# Patient Record
Sex: MALE | HISPANIC OR LATINO
[De-identification: names, ages, dates, MRNs, and addresses within clinical notes are randomized per-mention and may not be internally consistent; named-entity substitution may affect disease eponyms.]

---

## 2017-02-04 ENCOUNTER — HOSPITAL (OUTPATIENT)
Dept: OTHER | Age: 62
End: 2017-02-04
Attending: FAMILY MEDICINE

## 2020-05-01 ENCOUNTER — EXTERNAL RECORD (OUTPATIENT)
Dept: OTHER | Age: 65
End: 2020-05-01

## 2022-01-07 ENCOUNTER — HOSPITAL ENCOUNTER (OUTPATIENT)
Dept: GENERAL RADIOLOGY | Age: 67
Discharge: HOME | End: 2022-01-07
Attending: FAMILY MEDICINE
Payer: MEDICARE

## 2022-01-07 ENCOUNTER — OFFICE VISIT (OUTPATIENT)
Dept: FAMILY MEDICINE CLINIC | Facility: CLINIC | Age: 67
End: 2022-01-07
Payer: MEDICARE

## 2022-01-07 VITALS
HEIGHT: 69 IN | WEIGHT: 184 LBS | HEART RATE: 73 BPM | DIASTOLIC BLOOD PRESSURE: 64 MMHG | BODY MASS INDEX: 27.25 KG/M2 | TEMPERATURE: 98 F | SYSTOLIC BLOOD PRESSURE: 123 MMHG

## 2022-01-07 DIAGNOSIS — N13.8 BPH WITH OBSTRUCTION/LOWER URINARY TRACT SYMPTOMS: ICD-10-CM

## 2022-01-07 DIAGNOSIS — N40.1 BPH WITH OBSTRUCTION/LOWER URINARY TRACT SYMPTOMS: ICD-10-CM

## 2022-01-07 DIAGNOSIS — E55.9 VITAMIN D DEFICIENCY: ICD-10-CM

## 2022-01-07 DIAGNOSIS — E78.00 HYPERCHOLESTEREMIA: ICD-10-CM

## 2022-01-07 DIAGNOSIS — K21.00 GASTROESOPHAGEAL REFLUX DISEASE WITH ESOPHAGITIS, UNSPECIFIED WHETHER HEMORRHAGE: ICD-10-CM

## 2022-01-07 DIAGNOSIS — G89.29 CHRONIC PAIN OF RIGHT KNEE: ICD-10-CM

## 2022-01-07 DIAGNOSIS — E83.51 HYPOCALCEMIA: ICD-10-CM

## 2022-01-07 DIAGNOSIS — D69.6 THROMBOCYTOPENIA (HCC): ICD-10-CM

## 2022-01-07 DIAGNOSIS — M25.561 CHRONIC PAIN OF RIGHT KNEE: ICD-10-CM

## 2022-01-07 DIAGNOSIS — G89.29 CHRONIC PAIN OF RIGHT KNEE: Primary | ICD-10-CM

## 2022-01-07 DIAGNOSIS — M25.561 CHRONIC PAIN OF RIGHT KNEE: Primary | ICD-10-CM

## 2022-01-07 PROCEDURE — 99204 OFFICE O/P NEW MOD 45 MIN: CPT | Performed by: FAMILY MEDICINE

## 2022-01-07 PROCEDURE — 73562 X-RAY EXAM OF KNEE 3: CPT | Performed by: FAMILY MEDICINE

## 2022-01-07 RX ORDER — ERGOCALCIFEROL 1.25 MG/1
CAPSULE ORAL
COMMUNITY

## 2022-01-07 RX ORDER — ATORVASTATIN CALCIUM 10 MG/1
10 TABLET, FILM COATED ORAL NIGHTLY
COMMUNITY

## 2022-01-07 RX ORDER — PANTOPRAZOLE SODIUM 40 MG/1
40 TABLET, DELAYED RELEASE ORAL
Qty: 30 TABLET | Refills: 0 | Status: SHIPPED | OUTPATIENT
Start: 2022-01-07

## 2022-01-07 RX ORDER — TAMSULOSIN HYDROCHLORIDE 0.4 MG/1
CAPSULE ORAL DAILY
COMMUNITY

## 2022-01-07 RX ORDER — ICOSAPENT ETHYL 1000 MG/1
CAPSULE ORAL
COMMUNITY

## 2022-01-07 NOTE — PROGRESS NOTES
Esdras Forrest is a 77year old male who presents to establish care. HPI:   Patient has chronic right knee pain. 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 Feels better off work, knees are less painful.   Can do office wor well nourished, in no apparent distress  SKIN: no rashes, no suspicious lesions  HEENT: atraumatic, normocephalic, ears and throat are clear  EYES: PERRLA, EOMI, normal optic disk,conjunctiva are clear  NECK: supple, no adenopathy  LUNGS: clear to ausculta

## 2022-02-08 ENCOUNTER — TELEPHONE (OUTPATIENT)
Dept: NEUROLOGY | Facility: CLINIC | Age: 67
End: 2022-02-08

## 2022-02-08 ENCOUNTER — OFFICE VISIT (OUTPATIENT)
Dept: PHYSICAL MEDICINE AND REHAB | Facility: CLINIC | Age: 67
End: 2022-02-08
Payer: MEDICARE

## 2022-02-08 VITALS — HEART RATE: 84 BPM | WEIGHT: 184 LBS | BODY MASS INDEX: 27.25 KG/M2 | OXYGEN SATURATION: 96 % | HEIGHT: 69 IN

## 2022-02-08 DIAGNOSIS — M17.0 PRIMARY OSTEOARTHRITIS OF BOTH KNEES: ICD-10-CM

## 2022-02-08 DIAGNOSIS — M22.2X9 PATELLOFEMORAL PAIN SYNDROME, UNSPECIFIED LATERALITY: Primary | ICD-10-CM

## 2022-02-08 DIAGNOSIS — E78.5 HYPERLIPIDEMIA, UNSPECIFIED HYPERLIPIDEMIA TYPE: ICD-10-CM

## 2022-02-08 PROCEDURE — 99204 OFFICE O/P NEW MOD 45 MIN: CPT | Performed by: PHYSICAL MEDICINE & REHABILITATION

## 2022-02-08 NOTE — PATIENT INSTRUCTIONS
1) Begin formal physical therapy as soon as possible  2) My office will call you to schedule the BILATERAL knee HA and CSI under ultrasound guidance once the procedure is approved by your insurance carrier. 3) Tylenol 500-1000 mg every 6-8 hours as needed for pain. No more than 3000 mg daily. 4) Follow up with me in about 6-8 weeks but sooner for the injections.

## 2022-02-08 NOTE — TELEPHONE ENCOUNTER
Per Medicare guidelines authorization is not required for RIGHT knee Durolane and CSI under ultrasound guidance cpt codes 86745, , . Will inform Nursing.

## 2022-02-08 NOTE — TELEPHONE ENCOUNTER
Per Medicare guidelines authorization is not required for Bilateral knee Durolane and CSI under ultrasound guidance cpt codes 41504, ,  x 2. Will inform Nursing.

## 2022-02-18 ENCOUNTER — OFFICE VISIT (OUTPATIENT)
Dept: PHYSICAL THERAPY | Age: 67
End: 2022-02-18
Attending: PHYSICAL MEDICINE & REHABILITATION
Payer: MEDICARE

## 2022-02-18 DIAGNOSIS — M22.2X9 PATELLOFEMORAL PAIN SYNDROME, UNSPECIFIED LATERALITY: ICD-10-CM

## 2022-02-18 DIAGNOSIS — E78.5 HYPERLIPIDEMIA, UNSPECIFIED HYPERLIPIDEMIA TYPE: ICD-10-CM

## 2022-02-18 DIAGNOSIS — M17.0 PRIMARY OSTEOARTHRITIS OF BOTH KNEES: ICD-10-CM

## 2022-02-18 PROCEDURE — 97161 PT EVAL LOW COMPLEX 20 MIN: CPT | Performed by: PHYSICAL THERAPIST

## 2022-02-18 PROCEDURE — 97110 THERAPEUTIC EXERCISES: CPT | Performed by: PHYSICAL THERAPIST

## 2022-02-23 ENCOUNTER — OFFICE VISIT (OUTPATIENT)
Dept: PHYSICAL THERAPY | Age: 67
End: 2022-02-23
Attending: PHYSICAL MEDICINE & REHABILITATION
Payer: MEDICARE

## 2022-02-23 PROCEDURE — 97112 NEUROMUSCULAR REEDUCATION: CPT | Performed by: PHYSICAL THERAPIST

## 2022-02-23 PROCEDURE — 97110 THERAPEUTIC EXERCISES: CPT | Performed by: PHYSICAL THERAPIST

## 2022-02-25 ENCOUNTER — OFFICE VISIT (OUTPATIENT)
Dept: PHYSICAL THERAPY | Age: 67
End: 2022-02-25
Attending: PHYSICAL MEDICINE & REHABILITATION
Payer: MEDICARE

## 2022-02-25 PROCEDURE — 97110 THERAPEUTIC EXERCISES: CPT | Performed by: PHYSICAL THERAPIST

## 2022-02-25 NOTE — PROGRESS NOTES
Diagnosis: Patellofemoral pain syndrome, unspecified laterality (M22.2X9)  Primary osteoarthritis of both knees (M17.0)  Hyperlipidemia, unspecified hyperlipidemia type (E78.5)          Next MD visit: none scheduled  Fall Risk: standard         Precautions: n/a          Medication Changes since last visit?: No      Subjective: Reports B knee pain after prolonged inactivity. States he feels good after exercising  Pt describes pain level 3/10. Objective:  B knee ROM with some discomfort at end range flexion    Assessment: Reports abolished pain symptoms after therapeutic exercises. Demonstrates independence with HEP.       Plan: Continue PT.     2/23/2022  Visit#: 2/ Medicare   Thera Ex   x30min  - calf and knee flexion stretch 10 x 5 sec hold  - calf stretch on slant board 30 sec x 3  - B heel raises on slant board 10 x 2  - green theraband resisted LE abduction, extension 10 x 3  - Shuttle B LE extensions 7 bands 10 x 3  - Shuttle single LE extensions 5 bands 10 x 3  - Neuro re-ed  - Nustep L6 x 10min with cueing for full knee extension     Neuromuscular Re-ed   x10min LE proprioception and stabilization exercises:  - alternate forward BOSU lunges  - side BOSU lunges  - single leg stance on BOSU                     Charges: EX2, Neuro Re-ed       Total Timed Treatment: 40 min  Total Treatment Time: 40 min

## 2022-03-01 ENCOUNTER — OFFICE VISIT (OUTPATIENT)
Dept: PHYSICAL THERAPY | Age: 67
End: 2022-03-01
Attending: PHYSICAL MEDICINE & REHABILITATION
Payer: MEDICARE

## 2022-03-01 PROCEDURE — 97110 THERAPEUTIC EXERCISES: CPT | Performed by: PHYSICAL THERAPIST

## 2022-03-01 PROCEDURE — 97112 NEUROMUSCULAR REEDUCATION: CPT | Performed by: PHYSICAL THERAPIST

## 2022-03-04 ENCOUNTER — APPOINTMENT (OUTPATIENT)
Dept: PHYSICAL THERAPY | Age: 67
End: 2022-03-04
Attending: PHYSICAL MEDICINE & REHABILITATION
Payer: MEDICARE

## 2022-03-06 NOTE — TELEPHONE ENCOUNTER
Refill passed per [place] [employer] [place], [facility] protocol. Please review. Original script only for 30 tablets. Please advise if ok to refill for 6 months supply.     Requested Prescriptions   Pending Prescriptions Disp Refills    PANTOPRAZOLE 40 MG Oral Tab EC [Pharmacy Med Name: PANTOPRAZOLE 40MG TABLETS] 30 tablet 0     Sig: TAKE 1 TABLET(40 MG) BY MOUTH EVERY MORNING BEFORE BREAKFAST        Gastrointestional Medication Protocol Passed - 3/6/2022  2:31 PM        Passed - Appointment in past 12 or next 3 months              Recent Outpatient Visits              5 days ago     [facility] in [place] [address]., Bran Crowley, PT    Office Visit    1 week ago     [facility] in [place] [address]., Bran Crowley, PT    Office Visit    1 week ago     [facility] in [place] [address]., Bran Crowley, PT    Office Visit    2 weeks ago Patellofemoral pain syndrome, unspecified laterality    [facility] in [place] [address]., Bran Crowley, PT    Office Visit    3 weeks ago Patellofemoral pain syndrome, unspecified laterality    [facility] Jimena Machado MD    Office Visit            Future Appointments         Provider Department Appt Notes    In 2 days [place]., Leonela Johnson in [address]    In 4 days [place]., Leonela Johnson in [address]    In 1 week [place]., JACY Thomas in [address]    In 1 week [place]., JACY Thomas in [place] Medicare/Supp

## 2022-03-07 RX ORDER — PANTOPRAZOLE SODIUM 40 MG/1
40 TABLET, DELAYED RELEASE ORAL
Qty: 90 TABLET | Refills: 1 | Status: SHIPPED | OUTPATIENT
Start: 2022-03-07

## 2022-03-10 ENCOUNTER — OFFICE VISIT (OUTPATIENT)
Dept: PHYSICAL THERAPY | Age: 67
End: 2022-03-10
Attending: PHYSICAL MEDICINE & REHABILITATION
Payer: MEDICARE

## 2022-03-10 PROCEDURE — 97110 THERAPEUTIC EXERCISES: CPT | Performed by: PHYSICAL THERAPIST

## 2022-03-10 PROCEDURE — 97112 NEUROMUSCULAR REEDUCATION: CPT | Performed by: PHYSICAL THERAPIST

## 2022-03-14 ENCOUNTER — OFFICE VISIT (OUTPATIENT)
Dept: PHYSICAL THERAPY | Age: 67
End: 2022-03-14
Attending: PHYSICAL MEDICINE & REHABILITATION
Payer: MEDICARE

## 2022-03-14 PROCEDURE — 97112 NEUROMUSCULAR REEDUCATION: CPT | Performed by: PHYSICAL THERAPIST

## 2022-03-14 PROCEDURE — 97110 THERAPEUTIC EXERCISES: CPT | Performed by: PHYSICAL THERAPIST

## 2022-03-17 ENCOUNTER — OFFICE VISIT (OUTPATIENT)
Dept: PHYSICAL THERAPY | Age: 67
End: 2022-03-17
Attending: PHYSICAL MEDICINE & REHABILITATION
Payer: MEDICARE

## 2022-03-17 PROCEDURE — 97112 NEUROMUSCULAR REEDUCATION: CPT | Performed by: PHYSICAL THERAPIST

## 2022-03-17 PROCEDURE — 97110 THERAPEUTIC EXERCISES: CPT | Performed by: PHYSICAL THERAPIST

## 2022-04-04 ENCOUNTER — TELEPHONE (OUTPATIENT)
Dept: NEUROLOGY | Facility: CLINIC | Age: 67
End: 2022-04-04

## 2022-04-04 ENCOUNTER — OFFICE VISIT (OUTPATIENT)
Dept: PHYSICAL MEDICINE AND REHAB | Facility: CLINIC | Age: 67
End: 2022-04-04
Payer: MEDICARE

## 2022-04-04 VITALS
BODY MASS INDEX: 27.25 KG/M2 | SYSTOLIC BLOOD PRESSURE: 140 MMHG | HEART RATE: 82 BPM | HEIGHT: 69 IN | WEIGHT: 184 LBS | OXYGEN SATURATION: 98 % | DIASTOLIC BLOOD PRESSURE: 66 MMHG

## 2022-04-04 DIAGNOSIS — M22.2X9 PATELLOFEMORAL PAIN SYNDROME, UNSPECIFIED LATERALITY: Primary | ICD-10-CM

## 2022-04-04 DIAGNOSIS — E78.5 HYPERLIPIDEMIA, UNSPECIFIED HYPERLIPIDEMIA TYPE: ICD-10-CM

## 2022-04-04 DIAGNOSIS — M17.0 PRIMARY OSTEOARTHRITIS OF BOTH KNEES: ICD-10-CM

## 2022-04-04 PROCEDURE — 99214 OFFICE O/P EST MOD 30 MIN: CPT | Performed by: PHYSICAL MEDICINE & REHABILITATION

## 2022-04-04 PROCEDURE — 20611 DRAIN/INJ JOINT/BURSA W/US: CPT | Performed by: PHYSICAL MEDICINE & REHABILITATION

## 2022-04-04 RX ORDER — TRIAMCINOLONE ACETONIDE 40 MG/ML
40 INJECTION, SUSPENSION INTRA-ARTICULAR; INTRAMUSCULAR ONCE
Status: COMPLETED | OUTPATIENT
Start: 2022-04-04 | End: 2022-04-04

## 2022-04-04 RX ORDER — LIDOCAINE HYDROCHLORIDE 10 MG/ML
1 INJECTION, SOLUTION INFILTRATION; PERINEURAL ONCE
Status: COMPLETED | OUTPATIENT
Start: 2022-04-04 | End: 2022-04-04

## 2022-04-04 RX ADMIN — TRIAMCINOLONE ACETONIDE 40 MG: 40 INJECTION, SUSPENSION INTRA-ARTICULAR; INTRAMUSCULAR at 17:48:00

## 2022-04-04 RX ADMIN — LIDOCAINE HYDROCHLORIDE 1 ML: 10 INJECTION, SOLUTION INFILTRATION; PERINEURAL at 17:46:00

## 2022-04-04 NOTE — PATIENT INSTRUCTIONS
Post Injection Instructions     1. Please do not do anything strenuous over the next two days (if you had a knee injection do not walk more than 2 city blocks, do not attend any aerobic classes, do not run, no heavy lifting, no prolong standing). 2. You may resume your day to day activities after your injection. 3. You may experience some mild amount of swelling after the procedure. 4. Please ice your joint that was injected at least 5-6 times a day (15 minutes) for two days after (this will help prevent worsening pain that sometimes occurs after an injection). 5. Only take tylenol if needed for pain for the first few days. 6. Watch for signs of infection which include redness, warmth, worsening pain, fevers or chills. If you develop any of these signs call the office immediately at [phone]    Everyone responds differently to injections, but you can expect your peak effects a few weeks after your last injection. Kathe Giles.  Brittney Barrios MD  Physical Medicine and Rehabilitation/Sports Medicine  [facility]

## 2022-04-04 NOTE — PROCEDURES
[address]   Knee Joint Injection Procedure Note    CHIEF COMPLAINT:  Patient presents with:  Knee Pain: LOV: 2/8/22 Patient f/u on R>L pain, denies N/T. Currently in PT. Pain worsens when walking and standing. Takes Tylenol. PROCEDURE PERFORMED:  BILATERAL knee intra-articular Durolane and corticosteroid injection under ultrasound guidance    INDICATIONS:  BILATERAL knee pain and osteoarthritis    PRIMARY PROCEDURALIST:  Harpreet Longoria MD    INFORMED CONSENT & TIME OUT:   As documented in the Time Out and Pre-Procedure Check Lists. Verbal consent was obtained    Vitals: [unfilled]  Labs (document last wbc, plts, hgb, and PT/INR):    No results found for any previous visit.   ]    PROCEDURE:    LEFT knee:    PROCEDURE:  The procedure, risks, benefits and alternatives were discussed with the patient. Patient verbalized understanding and gave consent. The LEFT knee was prepped and draped in the usual sterile fashion. Using a linear ultrasound probe, the superomedial patella-femoral joint space was visualized. Using a 30-gauge, 1/2-inch needle, 1 cc of 1% lidocaine was used to numb the skin and soft tissues in the superolateral approach. The intra-articular space was then accessed using an 18-gauge, 1-1/2-inch needle, which was visualized on ultrasound, using approximately 5 cc of 1% lidocaine to numb the soft tissue. Once the intra-articular space was visualized on ultrasound, with the needle accessing the space, the syringe was traded for an empty 10 cc syringe and aspiration was attempted. 7 cc of serous straw colored fluid was removed from the LEFT knee. The Durolane  injection was attached to the needle and injected. During the injection, the Durolane was noted to enter the intra-articular space. The Durolane syringe was then switched out for a syringe containing 2 cc of 1% lidocaine and 1 cc of 40 mg/cc triamcinolone which was injected into the same space. The needle was then removed and hemostasis was achieved. Skin was cleansed and a dry dressing was placed. Patient tolerated the procedure well and no immediate complications noted. The patients pre procedure pain score was 0/10. The post procedure pain score was 0/10    RIGHT knee:    PROCEDURE:  The procedure, risks, benefits and alternatives were discussed with the patient. Patient verbalized understanding and gave consent. The RIGHT knee was prepped and draped in the usual sterile fashion. Using a linear ultrasound probe, the superomedial patella-femoral joint space was visualized. Using a 30-gauge, 1/2-inch needle, 1 cc of 1% lidocaine was used to numb the skin and soft tissues in the superolateral approach. The intra-articular space was then accessed using an 18-gauge, 1-1/2-inch needle, which was visualized on ultrasound, using approximately 4 cc of 1% lidocaine to numb the soft tissue. Once the intra-articular space was visualized on ultrasound, with the needle accessing the space, the syringe was traded for an empty 10 cc syringe and aspiration was attempted. 19 cc of serous straw colored fluid was removed from the RIGHT knee. The Durolane injection was attached to the needle and injected. During the injection, the Durolane was noted to enter the intra-articular space. The Durolane syringe was then switched out for a syringe containing 2 cc of 1% lidocaine and 1 cc of 40 mg/cc triamcinolone which was injected into the same space. The needle was then removed and hemostasis was achieved. Skin was cleansed and a dry dressing was placed. Patient verbalized understanding of assessment and plan. Patient is in agreement with the plan. All questions were answered. No barriers to learning identified. Permanent pictures were saved in our PACS systeme. INSTRUCTIONS GIVEN TO PATIENT:    \"You will see an effect in the next 2-3 days.   Please contact me if you have fevers, worsening swelling, worsening pain, decreased range of motion, increased redness, chills, or anything that makes you concerned about how the joint we injected feels/looks. If you do not reach me in a reasonable time, please report directly to the emergency room for further evaluation\"    Rakesh Cat.  Karrie Santoyo MD, [address] [place] [address]  Physical Medicine and Rehabilitation/Sports Medicine  MEDICAL CENTER [facility]

## 2022-04-04 NOTE — TELEPHONE ENCOUNTER
Per Medicare guidelines authorization is not required for LEFT knee Durolane and CSI under ultrasound guidance cpt code cpt  codes 32837, , . Will inform Nursing.

## 2022-04-25 ENCOUNTER — TELEPHONE (OUTPATIENT)
Dept: INTERNAL MEDICINE CLINIC | Facility: CLINIC | Age: 67
End: 2022-04-25

## 2022-04-25 NOTE — TELEPHONE ENCOUNTER
Left message for patient to call back. Please inform patient he is due for Medicare AWV and assist with scheduling.

## 2022-05-16 ENCOUNTER — OFFICE VISIT (OUTPATIENT)
Dept: FAMILY MEDICINE CLINIC | Facility: CLINIC | Age: 67
End: 2022-05-16
Payer: MEDICARE

## 2022-05-16 VITALS
BODY MASS INDEX: 27.4 KG/M2 | SYSTOLIC BLOOD PRESSURE: 144 MMHG | HEART RATE: 80 BPM | WEIGHT: 185 LBS | DIASTOLIC BLOOD PRESSURE: 75 MMHG | HEIGHT: 69 IN | TEMPERATURE: 98 F

## 2022-05-16 DIAGNOSIS — M25.561 CHRONIC PAIN OF RIGHT KNEE: ICD-10-CM

## 2022-05-16 DIAGNOSIS — N40.1 BPH WITH OBSTRUCTION/LOWER URINARY TRACT SYMPTOMS: Primary | ICD-10-CM

## 2022-05-16 DIAGNOSIS — N13.8 BPH WITH OBSTRUCTION/LOWER URINARY TRACT SYMPTOMS: Primary | ICD-10-CM

## 2022-05-16 DIAGNOSIS — D69.6 THROMBOCYTOPENIA (HCC): ICD-10-CM

## 2022-05-16 DIAGNOSIS — G89.29 CHRONIC PAIN OF RIGHT KNEE: ICD-10-CM

## 2022-05-16 DIAGNOSIS — E55.9 VITAMIN D DEFICIENCY: ICD-10-CM

## 2022-05-16 DIAGNOSIS — E78.00 HYPERCHOLESTEREMIA: ICD-10-CM

## 2022-05-16 PROCEDURE — 99214 OFFICE O/P EST MOD 30 MIN: CPT | Performed by: FAMILY MEDICINE

## 2022-05-16 RX ORDER — TAMSULOSIN HYDROCHLORIDE 0.4 MG/1
0.4 CAPSULE ORAL DAILY
Qty: 90 CAPSULE | Refills: 1 | Status: SHIPPED | OUTPATIENT
Start: 2022-05-16

## 2022-05-16 RX ORDER — MULTIVITAMIN
1 TABLET ORAL DAILY
Qty: 90 TABLET | Refills: 2 | Status: SHIPPED | OUTPATIENT
Start: 2022-05-16

## 2022-06-06 ENCOUNTER — OFFICE VISIT (OUTPATIENT)
Dept: PHYSICAL MEDICINE AND REHAB | Facility: CLINIC | Age: 67
End: 2022-06-06
Payer: MEDICARE

## 2022-06-06 ENCOUNTER — TELEPHONE (OUTPATIENT)
Dept: PHYSICAL MEDICINE AND REHAB | Facility: CLINIC | Age: 67
End: 2022-06-06

## 2022-06-06 VITALS
SYSTOLIC BLOOD PRESSURE: 140 MMHG | BODY MASS INDEX: 27.18 KG/M2 | HEIGHT: 69 IN | HEART RATE: 77 BPM | DIASTOLIC BLOOD PRESSURE: 70 MMHG | WEIGHT: 183.5 LBS | OXYGEN SATURATION: 96 %

## 2022-06-06 DIAGNOSIS — M22.2X9 PATELLOFEMORAL PAIN SYNDROME, UNSPECIFIED LATERALITY: Primary | ICD-10-CM

## 2022-06-06 DIAGNOSIS — E78.5 HYPERLIPIDEMIA, UNSPECIFIED HYPERLIPIDEMIA TYPE: ICD-10-CM

## 2022-06-06 DIAGNOSIS — M17.0 PRIMARY OSTEOARTHRITIS OF BOTH KNEES: ICD-10-CM

## 2022-06-06 NOTE — TELEPHONE ENCOUNTER
Per Medicare guidelines-no authorization required for Lidocaine/Kenalog injections in office    BILATERAL knee CSI under ultrasound guidance CPT [id number]    Status: Approved-Covered Benefit    Routing to clinical staff to schedule

## 2022-06-06 NOTE — PATIENT INSTRUCTIONS
1) My office will call you to schedule the BILATERAL knee CSI under ultrasound guidance once the procedure is approved by your insurance carrier. This can be done anytime after July 5, 2022. 2) In July, we will discuss surgical options and referrals so you can plan for knee replacement in January   3) Tylenol 500-1000 mg every 6-8 hours as needed for pain. No more than 3000 mg daily.

## 2022-07-14 ENCOUNTER — OFFICE VISIT (OUTPATIENT)
Dept: PHYSICAL MEDICINE AND REHAB | Facility: CLINIC | Age: 67
End: 2022-07-14
Payer: MEDICARE

## 2022-07-14 DIAGNOSIS — E78.5 HYPERLIPIDEMIA, UNSPECIFIED HYPERLIPIDEMIA TYPE: ICD-10-CM

## 2022-07-14 DIAGNOSIS — M22.2X9 PATELLOFEMORAL PAIN SYNDROME, UNSPECIFIED LATERALITY: Primary | ICD-10-CM

## 2022-07-14 DIAGNOSIS — M17.0 PRIMARY OSTEOARTHRITIS OF BOTH KNEES: ICD-10-CM

## 2022-07-14 PROCEDURE — 20611 DRAIN/INJ JOINT/BURSA W/US: CPT | Performed by: PHYSICAL MEDICINE & REHABILITATION

## 2022-07-14 NOTE — PATIENT INSTRUCTIONS
Post Injection Instructions     1. Please do not do anything strenuous over the next two days (if you had a knee injection do not walk more than 2 city blocks, do not attend any aerobic classes, do not run, no heavy lifting, no prolong standing). 2. You may resume your day to day activities after your injection. 3. You may experience some mild amount of swelling after the procedure. 4. Please ice your joint that was injected at least 5-6 times a day (15 minutes) for two days after (this will help prevent worsening pain that sometimes occurs after an injection). 5. Only take tylenol if needed for pain for the first few days. 6. Watch for signs of infection which include redness, warmth, worsening pain, fevers or chills. If you develop any of these signs call the office immediately at [phone]    Everyone responds differently to injections, but you can expect your peak effects a few weeks after your last injection. Boston Wynne.  Douglas Diallo MD  Physical Medicine and Rehabilitation/Sports Medicine  MEDICAL CENTER [facility]

## 2022-07-14 NOTE — PROCEDURES
[address]   Knee Joint Injection Procedure Note    CHIEF COMPLAINT:  No chief complaint on file. PROCEDURE PERFORMED:  BILATERAL knee intra-articular corticosteroid injection under ultrasound guidance    INDICATIONS:  BILATERAL knee pain and osteoarthritis    PRIMARY PROCEDURALIST:  Ting Hand MD    INFORMED CONSENT & TIME OUT:   As documented in the Time Out and Pre-Procedure Check Lists. Verbal consent was obtained    Vitals: [unfilled]  Labs (document last wbc, plts, hgb, and PT/INR):    No visits with results within 6 Month(s) from this visit. Latest known visit with results is:   No results found for any previous visit.   ]    PROCEDURE:  The procedure, risks, benefits, and alternatives were discussed with the patient. The patient verbalized understanding and gave verbal consent. The BILATERAL knee was prepped and draped in the standard sterile fashion using 3 sticks of Betadine. Using a linear high frequency probe, thesuprapatellar recess and bursa was visualized. A 18-gauge 1.5 inch needle with a 5 cc syringe containing 5 cc of 1% lidocaine was introduced through the superolateral approach and was seen entering the the joint capsule to anesthetize the skin and soft tissue. 5 cc of 1% lidocaine was used to anesthetize the skin and soft tissue. Aspiration was attempted with 14 cc on the left and 24 on the right of fluid aspirated. The syringe was switched out and a mixture of 4 cc 1% lidocaine and 1 cc of Kenalog containing 40 mg of corticosteroid was visualized being injected into the intra-articular space. The needle was then removed, hemostasis was obtained, Band-Aid was applied. Patient tolerated the procedure well. The patient was able to get up and ambulate. The pre-injection pain score was 0/10. The post-injection pain score was 0/10. Patient verbalized understanding of assessment and plan. Patient is in agreement with the plan.   All questions were answered. No barriers to learning identified. Permanent pictures were saved. INSTRUCTIONS GIVEN TO PATIENT:    \"You will see an effect in the next 2-3 days. Please contact me if you have fevers, worsening swelling, worsening pain, decreased range of motion, increased redness, chills, or anything that makes you concerned about how the joint we injected feels/looks. If you do not reach me in a reasonable time, please report directly to the emergency room for further evaluation\"    Follow up in 2 months     Lincoln Bucio MD, [facility]  Physical Medicine and Rehabilitation/Sports Medicine  MEDICAL CENTER [facility]

## 2022-07-15 ENCOUNTER — MED REC SCAN ONLY (OUTPATIENT)
Dept: PHYSICAL MEDICINE AND REHAB | Facility: CLINIC | Age: 67
End: 2022-07-15

## 2022-07-15 RX ORDER — TRIAMCINOLONE ACETONIDE 40 MG/ML
80 INJECTION, SUSPENSION INTRA-ARTICULAR; INTRAMUSCULAR ONCE
Status: COMPLETED | OUTPATIENT
Start: 2022-07-15 | End: 2022-07-15

## 2022-07-15 RX ORDER — LIDOCAINE HYDROCHLORIDE 10 MG/ML
18 INJECTION, SOLUTION INFILTRATION; PERINEURAL ONCE
Status: COMPLETED | OUTPATIENT
Start: 2022-07-15 | End: 2022-07-15

## 2022-11-14 DIAGNOSIS — N40.1 BPH WITH OBSTRUCTION/LOWER URINARY TRACT SYMPTOMS: ICD-10-CM

## 2022-11-14 DIAGNOSIS — N13.8 BPH WITH OBSTRUCTION/LOWER URINARY TRACT SYMPTOMS: ICD-10-CM

## 2022-11-14 RX ORDER — TAMSULOSIN HYDROCHLORIDE 0.4 MG/1
0.4 CAPSULE ORAL DAILY
Qty: 90 CAPSULE | Refills: 1 | Status: SHIPPED | OUTPATIENT
Start: 2022-11-14

## 2022-11-14 NOTE — TELEPHONE ENCOUNTER
Patient calling to request refill for Tamsulosin 0.4 and Cialis tadalafil 5 mg. Stated is completely out.

## 2022-12-13 ENCOUNTER — LAB ENCOUNTER (OUTPATIENT)
Dept: LAB | Age: 67
End: 2022-12-13
Attending: FAMILY MEDICINE
Payer: MEDICARE

## 2022-12-13 ENCOUNTER — OFFICE VISIT (OUTPATIENT)
Dept: FAMILY MEDICINE CLINIC | Facility: CLINIC | Age: 67
End: 2022-12-13
Payer: MEDICARE

## 2022-12-13 VITALS
HEIGHT: 69 IN | TEMPERATURE: 98 F | OXYGEN SATURATION: 98 % | BODY MASS INDEX: 26.36 KG/M2 | HEART RATE: 65 BPM | SYSTOLIC BLOOD PRESSURE: 113 MMHG | WEIGHT: 178 LBS | DIASTOLIC BLOOD PRESSURE: 69 MMHG

## 2022-12-13 DIAGNOSIS — N40.1 BPH WITH OBSTRUCTION/LOWER URINARY TRACT SYMPTOMS: ICD-10-CM

## 2022-12-13 DIAGNOSIS — N13.8 BPH WITH OBSTRUCTION/LOWER URINARY TRACT SYMPTOMS: ICD-10-CM

## 2022-12-13 DIAGNOSIS — E55.9 VITAMIN D DEFICIENCY: ICD-10-CM

## 2022-12-13 DIAGNOSIS — F41.9 ANXIETY: ICD-10-CM

## 2022-12-13 DIAGNOSIS — Z87.19 HISTORY OF LEFT INGUINAL HERNIA REPAIR: ICD-10-CM

## 2022-12-13 DIAGNOSIS — D69.6 THROMBOCYTOPENIA (HCC): ICD-10-CM

## 2022-12-13 DIAGNOSIS — Z00.00 ENCOUNTER FOR ANNUAL HEALTH EXAMINATION: ICD-10-CM

## 2022-12-13 DIAGNOSIS — K40.90 RIGHT INGUINAL HERNIA: ICD-10-CM

## 2022-12-13 DIAGNOSIS — Z00.00 ENCOUNTER FOR ANNUAL HEALTH EXAMINATION: Primary | ICD-10-CM

## 2022-12-13 DIAGNOSIS — M25.561 CHRONIC PAIN OF RIGHT KNEE: ICD-10-CM

## 2022-12-13 DIAGNOSIS — Z12.5 ENCOUNTER FOR SCREENING FOR MALIGNANT NEOPLASM OF PROSTATE: ICD-10-CM

## 2022-12-13 DIAGNOSIS — E78.00 HYPERCHOLESTEREMIA: ICD-10-CM

## 2022-12-13 DIAGNOSIS — E83.51 HYPOCALCEMIA: ICD-10-CM

## 2022-12-13 DIAGNOSIS — Z98.890 HISTORY OF LEFT INGUINAL HERNIA REPAIR: ICD-10-CM

## 2022-12-13 DIAGNOSIS — Z12.11 COLON CANCER SCREENING: ICD-10-CM

## 2022-12-13 DIAGNOSIS — G89.29 CHRONIC PAIN OF RIGHT KNEE: ICD-10-CM

## 2022-12-13 LAB
ALBUMIN SERPL-MCNC: 3.6 G/DL (ref 3.4–5)
ALBUMIN/GLOB SERPL: 1.1 {RATIO} (ref 1–2)
ALP LIVER SERPL-CCNC: 65 U/L
ALT SERPL-CCNC: 35 U/L
ANION GAP SERPL CALC-SCNC: 3 MMOL/L (ref 0–18)
AST SERPL-CCNC: 23 U/L (ref 15–37)
BASOPHILS # BLD AUTO: 0.02 X10(3) UL (ref 0–0.2)
BASOPHILS NFR BLD AUTO: 0.4 %
BILIRUB SERPL-MCNC: 0.7 MG/DL (ref 0.1–2)
BUN BLD-MCNC: 20 MG/DL (ref 7–18)
BUN/CREAT SERPL: 23 (ref 10–20)
CALCIUM BLD-MCNC: 8.8 MG/DL (ref 8.5–10.1)
CHLORIDE SERPL-SCNC: 107 MMOL/L (ref 98–112)
CHOLEST SERPL-MCNC: 167 MG/DL (ref ?–200)
CO2 SERPL-SCNC: 30 MMOL/L (ref 21–32)
COMPLEXED PSA SERPL-MCNC: 2.31 NG/ML (ref ?–4)
CREAT BLD-MCNC: 0.87 MG/DL
DEPRECATED RDW RBC AUTO: 44.1 FL (ref 35.1–46.3)
EOSINOPHIL # BLD AUTO: 0.06 X10(3) UL (ref 0–0.7)
EOSINOPHIL NFR BLD AUTO: 1.1 %
ERYTHROCYTE [DISTWIDTH] IN BLOOD BY AUTOMATED COUNT: 12.6 % (ref 11–15)
EST. AVERAGE GLUCOSE BLD GHB EST-MCNC: 120 MG/DL (ref 68–126)
FASTING PATIENT LIPID ANSWER: YES
FASTING STATUS PATIENT QL REPORTED: YES
GFR SERPLBLD BASED ON 1.73 SQ M-ARVRAT: 95 ML/MIN/1.73M2 (ref 60–?)
GLOBULIN PLAS-MCNC: 3.3 G/DL (ref 2.8–4.4)
GLUCOSE BLD-MCNC: 88 MG/DL (ref 70–99)
HBA1C MFR BLD: 5.8 % (ref ?–5.7)
HCT VFR BLD AUTO: 44.7 %
HDLC SERPL-MCNC: 39 MG/DL (ref 40–59)
HGB BLD-MCNC: 15 G/DL
IMM GRANULOCYTES # BLD AUTO: 0.02 X10(3) UL (ref 0–1)
IMM GRANULOCYTES NFR BLD: 0.4 %
LDLC SERPL CALC-MCNC: 113 MG/DL (ref ?–100)
LYMPHOCYTES # BLD AUTO: 1.78 X10(3) UL (ref 1–4)
LYMPHOCYTES NFR BLD AUTO: 33.3 %
MCH RBC QN AUTO: 32.2 PG (ref 26–34)
MCHC RBC AUTO-ENTMCNC: 33.6 G/DL (ref 31–37)
MCV RBC AUTO: 95.9 FL
MONOCYTES # BLD AUTO: 0.5 X10(3) UL (ref 0.1–1)
MONOCYTES NFR BLD AUTO: 9.3 %
NEUTROPHILS # BLD AUTO: 2.97 X10 (3) UL (ref 1.5–7.7)
NEUTROPHILS # BLD AUTO: 2.97 X10(3) UL (ref 1.5–7.7)
NEUTROPHILS NFR BLD AUTO: 55.5 %
NONHDLC SERPL-MCNC: 128 MG/DL (ref ?–130)
OSMOLALITY SERPL CALC.SUM OF ELEC: 292 MOSM/KG (ref 275–295)
PLATELET # BLD AUTO: 201 10(3)UL (ref 150–450)
POTASSIUM SERPL-SCNC: 4.3 MMOL/L (ref 3.5–5.1)
PROT SERPL-MCNC: 6.9 G/DL (ref 6.4–8.2)
RBC # BLD AUTO: 4.66 X10(6)UL
SODIUM SERPL-SCNC: 140 MMOL/L (ref 136–145)
TRIGL SERPL-MCNC: 76 MG/DL (ref 30–149)
TSI SER-ACNC: 1.14 MIU/ML (ref 0.36–3.74)
VLDLC SERPL CALC-MCNC: 13 MG/DL (ref 0–30)
WBC # BLD AUTO: 5.4 X10(3) UL (ref 4–11)

## 2022-12-13 PROCEDURE — 80053 COMPREHEN METABOLIC PANEL: CPT

## 2022-12-13 PROCEDURE — 85025 COMPLETE CBC W/AUTO DIFF WBC: CPT

## 2022-12-13 PROCEDURE — 80061 LIPID PANEL: CPT

## 2022-12-13 PROCEDURE — 83036 HEMOGLOBIN GLYCOSYLATED A1C: CPT

## 2022-12-13 PROCEDURE — 84443 ASSAY THYROID STIM HORMONE: CPT

## 2022-12-13 PROCEDURE — 36415 COLL VENOUS BLD VENIPUNCTURE: CPT

## 2022-12-13 RX ORDER — DULOXETIN HYDROCHLORIDE 30 MG/1
30 CAPSULE, DELAYED RELEASE ORAL DAILY
Qty: 90 CAPSULE | Refills: 1 | Status: SHIPPED | OUTPATIENT
Start: 2022-12-13

## 2023-01-03 ENCOUNTER — HOSPITAL ENCOUNTER (OUTPATIENT)
Dept: GENERAL RADIOLOGY | Facility: HOSPITAL | Age: 68
Discharge: HOME | End: 2023-01-03
Attending: ORTHOPAEDIC SURGERY
Payer: MEDICARE

## 2023-01-03 ENCOUNTER — OFFICE VISIT (OUTPATIENT)
Dept: ORTHOPEDICS CLINIC | Facility: CLINIC | Age: 68
End: 2023-01-03
Payer: MEDICARE

## 2023-01-03 DIAGNOSIS — M25.561 RIGHT KNEE PAIN, UNSPECIFIED CHRONICITY: ICD-10-CM

## 2023-01-03 DIAGNOSIS — M17.12 PRIMARY OSTEOARTHRITIS OF LEFT KNEE: ICD-10-CM

## 2023-01-03 DIAGNOSIS — M17.11 PRIMARY OSTEOARTHRITIS OF RIGHT KNEE: Primary | ICD-10-CM

## 2023-01-03 PROCEDURE — 99204 OFFICE O/P NEW MOD 45 MIN: CPT | Performed by: ORTHOPAEDIC SURGERY

## 2023-01-03 PROCEDURE — 73562 X-RAY EXAM OF KNEE 3: CPT | Performed by: ORTHOPAEDIC SURGERY

## 2023-01-11 ENCOUNTER — HOSPITAL ENCOUNTER (OUTPATIENT)
Dept: MRI IMAGING | Age: 68
Discharge: HOME | End: 2023-01-11
Attending: ORTHOPAEDIC SURGERY
Payer: MEDICARE

## 2023-01-11 DIAGNOSIS — M25.561 RIGHT KNEE PAIN, UNSPECIFIED CHRONICITY: ICD-10-CM

## 2023-01-11 DIAGNOSIS — M17.11 PRIMARY OSTEOARTHRITIS OF RIGHT KNEE: ICD-10-CM

## 2023-01-11 PROCEDURE — 73721 MRI JNT OF LWR EXTRE W/O DYE: CPT | Performed by: ORTHOPAEDIC SURGERY

## 2023-01-13 ENCOUNTER — TELEPHONE (OUTPATIENT)
Dept: PODIATRY CLINIC | Facility: CLINIC | Age: 68
End: 2023-01-13

## 2023-01-13 NOTE — TELEPHONE ENCOUNTER
Type of surgery:  Right TKA  Date: 3/31/23  Location: [facility]  Medical Clearance:      *Medical: Yes      *Dental: Yes      *Other:  Prior Authorization Status: Pending  Workers Comp:  Mayra/Emile: [id number]  [place]: Yes  POV: 4/17/23

## 2023-01-13 NOTE — TELEPHONE ENCOUNTER
Spoke with patient to offer surgery dates. He chose 3/31 for his surgery date. He was informed that he must have medical and dental clearance within 30 days prior to his surgery.

## 2023-01-16 NOTE — TELEPHONE ENCOUNTER
Patient requesting to get surgery done in the next 4 weeks. States that's what doctor requested.  Please advise

## 2023-01-19 ENCOUNTER — OFFICE VISIT (OUTPATIENT)
Dept: SURGERY | Facility: CLINIC | Age: 68
End: 2023-01-19

## 2023-01-19 ENCOUNTER — LAB ENCOUNTER (OUTPATIENT)
Dept: LAB | Facility: HOSPITAL | Age: 68
End: 2023-01-19
Attending: UROLOGY
Payer: MEDICARE

## 2023-01-19 VITALS — DIASTOLIC BLOOD PRESSURE: 77 MMHG | HEART RATE: 74 BPM | SYSTOLIC BLOOD PRESSURE: 129 MMHG

## 2023-01-19 DIAGNOSIS — N13.8 BPH WITH OBSTRUCTION/LOWER URINARY TRACT SYMPTOMS: ICD-10-CM

## 2023-01-19 DIAGNOSIS — N40.1 BPH WITH OBSTRUCTION/LOWER URINARY TRACT SYMPTOMS: ICD-10-CM

## 2023-01-19 DIAGNOSIS — N13.8 BPH WITH OBSTRUCTION/LOWER URINARY TRACT SYMPTOMS: Primary | ICD-10-CM

## 2023-01-19 DIAGNOSIS — N40.1 BPH WITH OBSTRUCTION/LOWER URINARY TRACT SYMPTOMS: Primary | ICD-10-CM

## 2023-01-19 LAB
BILIRUB UR QL: NEGATIVE
CLARITY UR: CLEAR
COLOR UR: YELLOW
GLUCOSE UR-MCNC: NEGATIVE MG/DL
HGB UR QL STRIP.AUTO: NEGATIVE
KETONES UR-MCNC: NEGATIVE MG/DL
LEUKOCYTE ESTERASE UR QL STRIP.AUTO: NEGATIVE
NITRITE UR QL STRIP.AUTO: NEGATIVE
PH UR: 5 [PH] (ref 5–8)
PROT UR-MCNC: 30 MG/DL
SP GR UR STRIP: 1.03 (ref 1–1.03)
UROBILINOGEN UR STRIP-ACNC: <2
VIT C UR-MCNC: NEGATIVE MG/DL

## 2023-01-19 PROCEDURE — 51798 US URINE CAPACITY MEASURE: CPT | Performed by: UROLOGY

## 2023-01-19 PROCEDURE — 81001 URINALYSIS AUTO W/SCOPE: CPT

## 2023-01-19 PROCEDURE — 99204 OFFICE O/P NEW MOD 45 MIN: CPT | Performed by: UROLOGY

## 2023-01-20 ENCOUNTER — TELEPHONE (OUTPATIENT)
Dept: SURGERY | Facility: CLINIC | Age: 68
End: 2023-01-20

## 2023-01-20 NOTE — TELEPHONE ENCOUNTER
Called patient and he identified himself with his full name and . I read him [facility] message as below. Pt verbalized understanding and had no further questions      ----- Message from Ravinder New MD sent at 2023  8:17 AM CST -----  Please notify patient his urinalysis results are normal.  He should follow-up as scheduled.

## 2023-01-26 ENCOUNTER — TELEPHONE (OUTPATIENT)
Facility: CLINIC | Age: 68
End: 2023-01-26

## 2023-01-26 ENCOUNTER — OFFICE VISIT (OUTPATIENT)
Dept: GASTROENTEROLOGY | Facility: CLINIC | Age: 68
End: 2023-01-26

## 2023-01-26 VITALS
BODY MASS INDEX: 26.81 KG/M2 | SYSTOLIC BLOOD PRESSURE: 130 MMHG | HEIGHT: 69 IN | HEART RATE: 85 BPM | WEIGHT: 181 LBS | DIASTOLIC BLOOD PRESSURE: 70 MMHG

## 2023-01-26 DIAGNOSIS — Z12.11 COLON CANCER SCREENING: Primary | ICD-10-CM

## 2023-01-26 DIAGNOSIS — K21.9 GASTROESOPHAGEAL REFLUX DISEASE, UNSPECIFIED WHETHER ESOPHAGITIS PRESENT: ICD-10-CM

## 2023-01-26 PROCEDURE — 99204 OFFICE O/P NEW MOD 45 MIN: CPT | Performed by: NURSE PRACTITIONER

## 2023-01-26 RX ORDER — POLYETHYLENE GLYCOL 3350, SODIUM CHLORIDE, SODIUM BICARBONATE, POTASSIUM CHLORIDE 420; 11.2; 5.72; 1.48 G/4L; G/4L; G/4L; G/4L
POWDER, FOR SOLUTION ORAL
Qty: 4000 ML | Refills: 0 | Status: SHIPPED | OUTPATIENT
Start: 2023-01-26

## 2023-01-26 NOTE — TELEPHONE ENCOUNTER
Scheduled for:  Colonoscopy [id number]  Provider Name:  Dr Paula May  Date:  02/07/2023  Location:  [facility]  Sedation:  MAC  Time:  8:00am ( Patient is aware arrival time is at 7:00am)  Prep:  Trilyte  Meds/Allergies Reconciled?:  Mary Cat NP, Reviewed   Diagnosis with codes:  Colon Screening Z12.11  Was patient informed to call insurance with codes (Y/N):  Yes  Referral sent?:  Referral was sent at the time of electronic surgical scheduling. [facility] or [address] notified?:  I sent an electronic request to Endo Scheduling and received a confirmation today. Medication Orders:  Pt is aware to NOT take iron pills, herbal meds and diet supplements for 7 days before exam. Also to NOT take any form of alcohol, recreational drugs and any forms of ED meds 24 hours before exam.   Misc Orders:       Further instructions given by staff:  I provide prep instructions to patient at the time of the appointment and reviewed date, time and location, he verbalized that he understood and is aware to call if he has any questions.

## 2023-01-27 ENCOUNTER — TELEPHONE (OUTPATIENT)
Dept: SURGERY | Facility: CLINIC | Age: 68
End: 2023-01-27

## 2023-01-27 NOTE — TELEPHONE ENCOUNTER
ROSCOETCLIEN left a [place] message that  Сергей Rose had a change in his schedule and the procedure that is currently scheduled for 2/3/23 at 8:30am would need to me moved to 2/2/23 at 8:30.  If this new date doesn't work for patient he can be rescheduled for the next available procedure date

## 2023-02-03 ENCOUNTER — TELEPHONE (OUTPATIENT)
Dept: SURGERY | Facility: CLINIC | Age: 68
End: 2023-02-03

## 2023-02-03 NOTE — TELEPHONE ENCOUNTER
Pt called stating appointment yesterday 2-2-23 for cysto and trus was canceled.   Please call to reschedule

## 2023-02-03 NOTE — TELEPHONE ENCOUNTER
Spoke with patient, assisted in rescheduling cysto/trus. PT aware of pre procedure instructions. PT aware to arrive at 8:30am for 9:0aam procedure.    PT   Future Appointments   Date Time Provider Moose Arrington   2/28/2023  9:00 AM Scott Estrada MD [facility]   4/17/2023  9:10 AM Deborah Chang MD [facility]

## 2023-02-17 NOTE — TELEPHONE ENCOUNTER
Refill passed per [address] protocol. Requested Prescriptions   Pending Prescriptions Disp Refills    tamsulosin 0.4 MG Oral Cap 90 capsule 1     Sig: Take 1 capsule (0.4 mg total) by mouth daily.        Genitourinary Medications Passed - 11/14/2022  5:50 PM        Passed - Patient does not have pulmonary hypertension on problem list        Passed - In person appointment or virtual visit in the past 12 mos or appointment in next 3 mos     Recent Outpatient Visits              4 months ago Patellofemoral pain syndrome, unspecified laterality    [facility] Christiano Alicea MD    Office Visit    5 months ago Patellofemoral pain syndrome, unspecified laterality    [facility] Christiano Alicea MD    Office Visit    6 months ago BPH with obstruction/lower urinary tract symptoms    [facility], [place] , Dario Hartley MD    Office Visit    7 months ago Patellofemoral pain syndrome, unspecified laterality    [facility] Christiano Alicea MD    Office Visit    8 months ago     [facility] in [place] [address]., Cassie Del Valle, JACY    Office Visit          Future Appointments         Provider Department Appt Notes    In 4 weeks Iliana Bergeron MD [address], [facility], [place], last px: none                     Recent Outpatient Visits              4 months ago Patellofemoral pain syndrome, unspecified laterality    [facility] Christiano Alicea MD    Office Visit    5 months ago Patellofemoral pain syndrome, unspecified laterality    [address] [facility] Consuelo Guzman MD    Office Visit    6 months ago BPH with obstruction/lower urinary tract symptoms    [facility], Dario Hartley MD Office Visit    7 months ago Patellofemoral pain syndrome, unspecified laterality    [facility]    Office Visit    8 months ago     [facility] in [place] [address]., Les Beckford, PT    Office Visit            Future Appointments         Provider Department Appt Notes    In 4 weeks Tran Wood MD [facility], [address], [place] Px, last px: none no

## 2023-02-28 ENCOUNTER — PROCEDURE (OUTPATIENT)
Dept: SURGERY | Facility: CLINIC | Age: 68
End: 2023-02-28

## 2023-02-28 VITALS — HEART RATE: 68 BPM | DIASTOLIC BLOOD PRESSURE: 67 MMHG | SYSTOLIC BLOOD PRESSURE: 105 MMHG

## 2023-02-28 DIAGNOSIS — N40.1 BPH WITH OBSTRUCTION/LOWER URINARY TRACT SYMPTOMS: Primary | ICD-10-CM

## 2023-02-28 DIAGNOSIS — N13.8 BPH WITH OBSTRUCTION/LOWER URINARY TRACT SYMPTOMS: Primary | ICD-10-CM

## 2023-02-28 PROCEDURE — 99214 OFFICE O/P EST MOD 30 MIN: CPT | Performed by: UROLOGY

## 2023-02-28 PROCEDURE — 52000 CYSTOURETHROSCOPY: CPT | Performed by: UROLOGY

## 2023-02-28 RX ORDER — CIPROFLOXACIN 500 MG/1
500 TABLET, FILM COATED ORAL ONCE
Status: COMPLETED | OUTPATIENT
Start: 2023-02-28 | End: 2023-02-28

## 2023-02-28 RX ADMIN — CIPROFLOXACIN 500 MG: 500 TABLET, FILM COATED ORAL at 10:54:00

## 2023-03-01 ENCOUNTER — TELEPHONE (OUTPATIENT)
Dept: SURGERY | Facility: CLINIC | Age: 68
End: 2023-03-01

## 2023-03-01 DIAGNOSIS — Z01.818 PREOP EXAMINATION: ICD-10-CM

## 2023-03-01 DIAGNOSIS — N13.8 BPH WITH OBSTRUCTION/LOWER URINARY TRACT SYMPTOMS: Primary | ICD-10-CM

## 2023-03-01 DIAGNOSIS — N40.1 BPH WITH OBSTRUCTION/LOWER URINARY TRACT SYMPTOMS: Primary | ICD-10-CM

## 2023-03-01 DIAGNOSIS — R39.89 OTHER SYMPTOMS AND SIGNS INVOLVING THE GENITOURINARY SYSTEM: ICD-10-CM

## 2023-03-01 NOTE — TELEPHONE ENCOUNTER
Spoke with patient' son ed, will check dates and call back to schedule, please transfer call to [phone].

## 2023-03-01 NOTE — TELEPHONE ENCOUNTER
Patient returned call scheduled, cystoscopy, green light laser vaporization of the prostate, Thursday 04/20/2023,

## 2023-03-02 DIAGNOSIS — K21.00 GASTROESOPHAGEAL REFLUX DISEASE WITH ESOPHAGITIS, UNSPECIFIED WHETHER HEMORRHAGE: ICD-10-CM

## 2023-03-02 RX ORDER — TADALAFIL 5 MG/1
TABLET ORAL
Qty: 30 TABLET | Refills: 0 | OUTPATIENT
Start: 2023-03-02

## 2023-03-02 RX ORDER — PANTOPRAZOLE SODIUM 40 MG/1
TABLET, DELAYED RELEASE ORAL
Qty: 90 TABLET | Refills: 0 | OUTPATIENT
Start: 2023-03-02

## 2023-03-02 NOTE — TELEPHONE ENCOUNTER
Spoke with pt,  verified. Pt stated gen cialis meds was rx'd by his previous PCP. Pt was advised to set up a f/u appt for med refill. Pt agree and stated understanding.    Warm transfer to [facility] staff      Future Appointments   Date Time Provider Moose Arrington   3/6/2023  1:45 PM Merline Gómez APRN ECADOFM EC ADO   2023  9:10 AM Clifton Mac MD THE [facility] OF THE [facility]   2023 12:30 PM CHELA, PROCEDURE [id number] None

## 2023-03-02 NOTE — TELEPHONE ENCOUNTER
Per med list pantoprazole 3-7-22 # 90 + 1 was discontinued, also gen jain is not on med list, pls f/u with pt.        Refill passed per [place] [facility] protocol   Requested Prescriptions   Pending Prescriptions Disp Refills    PANTOPRAZOLE 40 MG Oral Tab EC [Pharmacy Med Name: PANTOPRAZOLE 40MG TABLETS] 90 tablet 1     Sig: TAKE 1 TABLET(40 MG) BY MOUTH BEFORE BREAKFAST       Gastrointestional Medication Protocol Passed - 3/2/2023  2:20 PM        Passed - In person appointment or virtual visit in the past 12 mos or appointment in next 3 mos     Recent Outpatient Visits              1 month ago Colon cancer screening    [facility], [facility], APRN    Office Visit    1 month ago BPH with obstruction/lower urinary tract symptoms    [facility], Dennis Gagnon MD    Office Visit    1 month ago Primary osteoarthritis of right knee    [address] [facility],[address], [address], Diana Kitchen MD    Office Visit    2 months ago Encounter for annual health examination    [facility], Inga Smith MD    Office Visit    7 months ago Patellofemoral pain syndrome, unspecified laterality    [facility], Ross Watkins MD    Office Visit          Future Appointments         Provider Department Appt Notes    In 1 month Hailey Gillette MD [facility], [address], [place] 1sto POV Right TKA    In 2 months [address], [facility], [address], [place] CLN w/MAC @ [address] [place] [address]                 TADALAFIL 5 MG Oral Tab [Pharmacy Med Name: TADALAFIL 5MG TABLETS] 30 tablet 0     Sig: TAKE 1 TABLET BY MOUTH EVERY DAY AS NEEDED       Genitourinary Medications Passed - 3/2/2023  2:20 PM        Passed - Patient does not have pulmonary hypertension on problem list        Passed - In person appointment or virtual visit in the past 12 mos or appointment in next 3 mos     Recent Outpatient Visits              1 month ago Colon cancer screening    [facility], [facility], APRN    Office Visit    1 month ago BPH with obstruction/lower urinary tract symptoms    [facility], Dorma Closs, MD    Office Visit    1 month ago Primary osteoarthritis of right knee    [facility], Cesar Ling MD    Office Visit    2 months ago Encounter for annual health examination    [facility], Lew Germain MD    Office Visit    7 months ago Patellofemoral pain syndrome, unspecified laterality    [facility], Fei Zaldivar MD    Office Visit          Future Appointments         Provider Department Appt Notes    In 1 month Deborah Chang MD [address], [address], [place] 1[address] POV Right TKA    In 2 months Erlinda'delia Burrell, [facility] [address], [address], [place] CLN w/MAC @ [address] [place] [address]

## 2023-03-06 ENCOUNTER — OFFICE VISIT (OUTPATIENT)
Dept: FAMILY MEDICINE CLINIC | Facility: CLINIC | Age: 68
End: 2023-03-06

## 2023-03-06 VITALS
DIASTOLIC BLOOD PRESSURE: 77 MMHG | WEIGHT: 181 LBS | HEART RATE: 75 BPM | BODY MASS INDEX: 26.81 KG/M2 | SYSTOLIC BLOOD PRESSURE: 130 MMHG | HEIGHT: 69 IN

## 2023-03-06 DIAGNOSIS — K21.9 GASTROESOPHAGEAL REFLUX DISEASE WITHOUT ESOPHAGITIS: ICD-10-CM

## 2023-03-06 DIAGNOSIS — L30.9 DERMATITIS: ICD-10-CM

## 2023-03-06 DIAGNOSIS — N52.9 ERECTILE DYSFUNCTION, UNSPECIFIED ERECTILE DYSFUNCTION TYPE: Primary | ICD-10-CM

## 2023-03-06 PROCEDURE — 99213 OFFICE O/P EST LOW 20 MIN: CPT | Performed by: NURSE PRACTITIONER

## 2023-03-06 RX ORDER — CHLORAL HYDRATE 500 MG
CAPSULE ORAL
COMMUNITY

## 2023-03-06 RX ORDER — TRIAMCINOLONE ACETONIDE 1 MG/G
CREAM TOPICAL 2 TIMES DAILY PRN
Qty: 45 G | Refills: 2 | Status: SHIPPED | OUTPATIENT
Start: 2023-03-06

## 2023-03-06 RX ORDER — SILDENAFIL 50 MG/1
50 TABLET, FILM COATED ORAL
Qty: 20 TABLET | Refills: 3 | Status: SHIPPED | OUTPATIENT
Start: 2023-03-06

## 2023-03-06 RX ORDER — ASCORBIC ACID
CRYSTALS ORAL
COMMUNITY

## 2023-03-06 RX ORDER — TADALAFIL 20 MG/1
20 TABLET ORAL AS NEEDED
Qty: 24 TABLET | Refills: 3 | Status: SHIPPED | OUTPATIENT
Start: 2023-03-06

## 2023-03-06 RX ORDER — PANTOPRAZOLE SODIUM 40 MG/1
40 TABLET, DELAYED RELEASE ORAL
Qty: 90 TABLET | Refills: 1 | Status: SHIPPED | OUTPATIENT
Start: 2023-03-06

## 2023-03-06 NOTE — ASSESSMENT & PLAN NOTE
Discussed decreasing acidic foods and drinks, spicy and fried foods  Refill pantoprazole.    Follow up gi

## 2023-03-13 ENCOUNTER — TELEPHONE (OUTPATIENT)
Dept: ORTHOPEDICS CLINIC | Facility: CLINIC | Age: 68
End: 2023-03-13

## 2023-03-13 DIAGNOSIS — M17.11 PRIMARY OSTEOARTHRITIS OF RIGHT KNEE: Primary | ICD-10-CM

## 2023-03-14 NOTE — TELEPHONE ENCOUNTER
Dr Bharati Au    Please note - patient is cancelling surgery for 3/31/23. Sent an email to [place] as well.

## 2023-03-14 NOTE — TELEPHONE ENCOUNTER
Using the language line Naida Schirmer #[id number]. LMTCB in regards to patient's message below. Surgery has been cancelled. Sent email to [facility] to notify her. Will wait to hear back from patient, to see when he wants to reschedule surgery.   MRI was completed 1/11/23

## 2023-03-14 NOTE — TELEPHONE ENCOUNTER
Left message for patient to call back. He has urology surgery scheduled for 4/20/23, it may be due to that. I will continue to try to contact patient and get him rescheduled.

## 2023-04-03 ENCOUNTER — TELEPHONE (OUTPATIENT)
Dept: ORTHOPEDICS CLINIC | Facility: CLINIC | Age: 68
End: 2023-04-03

## 2023-04-14 ENCOUNTER — TELEPHONE (OUTPATIENT)
Dept: SURGERY | Facility: CLINIC | Age: 68
End: 2023-04-14

## 2023-04-14 DIAGNOSIS — N13.8 BPH WITH OBSTRUCTION/LOWER URINARY TRACT SYMPTOMS: Primary | ICD-10-CM

## 2023-04-14 DIAGNOSIS — N40.1 BPH WITH OBSTRUCTION/LOWER URINARY TRACT SYMPTOMS: Primary | ICD-10-CM

## 2023-04-14 NOTE — TELEPHONE ENCOUNTER
Called patient about pre-op labs, stated that he wants to postpone, states he'll call back when able to reschedule.

## 2023-05-09 ENCOUNTER — HOSPITAL ENCOUNTER (OUTPATIENT)
Facility: HOSPITAL | Age: 68
Setting detail: HOSPITAL OUTPATIENT SURGERY
Discharge: HOME | End: 2023-05-09
Attending: INTERNAL MEDICINE | Admitting: INTERNAL MEDICINE
Payer: MEDICARE

## 2023-05-09 ENCOUNTER — ANESTHESIA (OUTPATIENT)
Dept: ENDOSCOPY | Facility: HOSPITAL | Age: 68
End: 2023-05-09
Payer: MEDICARE

## 2023-05-09 ENCOUNTER — ANESTHESIA EVENT (OUTPATIENT)
Dept: ENDOSCOPY | Facility: HOSPITAL | Age: 68
End: 2023-05-09
Payer: MEDICARE

## 2023-05-09 VITALS
RESPIRATION RATE: 17 BRPM | OXYGEN SATURATION: 92 % | HEIGHT: 69 IN | SYSTOLIC BLOOD PRESSURE: 119 MMHG | HEART RATE: 59 BPM | WEIGHT: 180 LBS | DIASTOLIC BLOOD PRESSURE: 73 MMHG | BODY MASS INDEX: 26.66 KG/M2

## 2023-05-09 DIAGNOSIS — Z12.11 COLON CANCER SCREENING: ICD-10-CM

## 2023-05-09 PROCEDURE — 0DBL8ZX EXCISION OF TRANSVERSE COLON, VIA NATURAL OR ARTIFICIAL OPENING ENDOSCOPIC, DIAGNOSTIC: ICD-10-PCS | Performed by: INTERNAL MEDICINE

## 2023-05-09 PROCEDURE — 0DBK8ZX EXCISION OF ASCENDING COLON, VIA NATURAL OR ARTIFICIAL OPENING ENDOSCOPIC, DIAGNOSTIC: ICD-10-PCS | Performed by: INTERNAL MEDICINE

## 2023-05-09 PROCEDURE — 45385 COLONOSCOPY W/LESION REMOVAL: CPT | Performed by: INTERNAL MEDICINE

## 2023-05-09 RX ORDER — SODIUM CHLORIDE, SODIUM LACTATE, POTASSIUM CHLORIDE, CALCIUM CHLORIDE 600; 310; 30; 20 MG/100ML; MG/100ML; MG/100ML; MG/100ML
INJECTION, SOLUTION INTRAVENOUS CONTINUOUS
OUTPATIENT
Start: 2023-05-09

## 2023-05-09 RX ORDER — NALOXONE HYDROCHLORIDE 0.4 MG/ML
80 INJECTION, SOLUTION INTRAMUSCULAR; INTRAVENOUS; SUBCUTANEOUS AS NEEDED
OUTPATIENT
Start: 2023-05-09 | End: 2023-05-09

## 2023-05-09 RX ORDER — SODIUM CHLORIDE, SODIUM LACTATE, POTASSIUM CHLORIDE, CALCIUM CHLORIDE 600; 310; 30; 20 MG/100ML; MG/100ML; MG/100ML; MG/100ML
INJECTION, SOLUTION INTRAVENOUS CONTINUOUS
Status: DISCONTINUED | OUTPATIENT
Start: 2023-05-09 | End: 2023-05-09

## 2023-05-09 RX ORDER — LIDOCAINE HYDROCHLORIDE 10 MG/ML
INJECTION, SOLUTION EPIDURAL; INFILTRATION; INTRACAUDAL; PERINEURAL AS NEEDED
Status: DISCONTINUED | OUTPATIENT
Start: 2023-05-09 | End: 2023-05-09 | Stop reason: SURG

## 2023-05-09 RX ADMIN — LIDOCAINE HYDROCHLORIDE 50 MG: 10 INJECTION, SOLUTION EPIDURAL; INFILTRATION; INTRACAUDAL; PERINEURAL at 11:21:00

## 2023-05-09 RX ADMIN — SODIUM CHLORIDE, SODIUM LACTATE, POTASSIUM CHLORIDE, CALCIUM CHLORIDE: 600; 310; 30; 20 INJECTION, SOLUTION INTRAVENOUS at 10:55:00

## 2023-05-09 NOTE — ANESTHESIA POSTPROCEDURE EVALUATION
Patient: Tracy Birmingham    Procedure Summary     Date: 05/09/23 Room / Location: [address] [place] [address] ENDOSCOPY 01 / [address] [place] [address] ENDOSCOPY    Anesthesia Start: 3239 Anesthesia Stop:     Procedure: COLONOSCOPY Diagnosis:       Colon cancer screening      (colon polyps, diverticulosis, hemorrhoids)    Surgeons: Obi Echols MD Anesthesiologist: Damaris Santoyo CRNA    Anesthesia Type: MAC ASA Status: 2          Anesthesia Type: MAC    Vitals Value Taken Time   /73 05/09/23 1215   Temp 0 05/09/23 1242   Pulse 59 05/09/23 1217   Resp 15 05/09/23 1217   SpO2 95 % 05/09/23 1210   Vitals shown include unvalidated device data.     [address] [place] [address] AN Post Evaluation:   Patient Evaluated in PACU  Patient Participation: complete - patient participated  Level of Consciousness: awake and alert  Pain Score: 0  Pain Management: adequate  Airway Patency:patent  Yes    Cardiovascular Status: acceptable  Respiratory Status: acceptable and room air  Postoperative Hydration acceptable  Comments: Report to [facility]      Reina Carl CRNA  5/9/2023 12:42 PM

## 2023-05-09 NOTE — DISCHARGE INSTRUCTIONS

## 2023-05-24 ENCOUNTER — TELEPHONE (OUTPATIENT)
Dept: GASTROENTEROLOGY | Facility: CLINIC | Age: 68
End: 2023-05-24

## 2023-05-24 NOTE — TELEPHONE ENCOUNTER
I mailed out colonoscopy results letter to pt  Updated health maintenance  Entered into 3 yr recall  Recall colon in 3 years per.  Colon done 5/09/2023      Adriano Brock MD  [facility] Clinical Staff  GI staff: please place recall for colonoscopy in 3 years

## 2023-07-06 ENCOUNTER — TELEPHONE (OUTPATIENT)
Dept: FAMILY MEDICINE CLINIC | Facility: CLINIC | Age: 68
End: 2023-07-06

## 2023-07-06 RX ORDER — TAMSULOSIN HYDROCHLORIDE 0.4 MG/1
0.8 CAPSULE ORAL DAILY
Qty: 60 CAPSULE | Refills: 0 | Status: SHIPPED | OUTPATIENT
Start: 2023-07-06 | End: 2023-08-05

## 2023-07-06 NOTE — TELEPHONE ENCOUNTER
Patient calling ( identified name and  ) states his  Tamsulosin medication ran out  because he took 2 pills instead of one     Has been taking two pills \" for awhile and I feel better \"  than when taking one pill  Instructed to STOP taking 2 pills       Last fill was 23    Please advise and thank you.     Routing as Dr. Lee Oconnor  is out of office           Best call back number: [facility]  at [phone]

## 2023-08-07 DIAGNOSIS — K21.9 GASTROESOPHAGEAL REFLUX DISEASE WITHOUT ESOPHAGITIS: ICD-10-CM

## 2023-08-07 NOTE — TELEPHONE ENCOUNTER
Patient states that he is out of the Tamsulosin and Pantoprazole medication need new prescription to be sent to [place].     Current Outpatient Medications   Medication Sig Dispense Refill                         pantoprazole 40 MG Oral Tab EC Take 1 tablet (40 mg total) by mouth every morning before breakfast. 90 tablet 1

## 2023-08-15 RX ORDER — PANTOPRAZOLE SODIUM 40 MG/1
40 TABLET, DELAYED RELEASE ORAL
Qty: 90 TABLET | Refills: 3 | Status: SHIPPED | OUTPATIENT
Start: 2023-08-15

## 2023-08-15 RX ORDER — TAMSULOSIN HYDROCHLORIDE 0.4 MG/1
0.4 CAPSULE ORAL NIGHTLY
Qty: 30 CAPSULE | Refills: 1 | Status: CANCELLED | OUTPATIENT
Start: 2023-08-15 | End: 2023-09-14

## 2023-08-15 NOTE — TELEPHONE ENCOUNTER
Refill passed per [employer], [facility] protocol. Tamsulosin increased to BID but patient only given 30-day supply, no refills. Is refill appropriate? Future Appointments   Date Time Provider Mo[place] Arrington   8/18/2023  4:00 PM Gladis Moreno MD [facility] ADO     Requested Prescriptions   Pending Prescriptions Disp Refills    pantoprazole 40 MG Oral Tab EC 90 tablet 1     Sig: Take 1 tablet (40 mg total) by mouth every morning before breakfast.       Gastrointestional Medication Protocol Passed - 8/15/2023  3:51 PM        Passed - In person appointment or virtual visit in the past 12 mos or appointment in next 3 mos     Recent Outpatient Visits              5 months ago Erectile dysfunction, unspecified erectile dysfunction type    [facility], [address], [place] Briseno APRMOE    Office Visit    6 months ago Colon cancer screening    [address], [address] [facility], APRN    Office Visit    6 months ago BPH with obstruction/lower urinary tract symptoms    [address], Li Pope MD    Office Visit    7 months ago Primary osteoarthritis of right knee    [address], Estefanía Brown MD    Office Visit    8 months ago Encounter for annual health examination    [address], Chantel Smith MD    Office Visit          Future Appointments         Provider Department Appt Notes    In 3 days Gladis Moreno MD [address], [place] follow up                 tamsulosin 0.4 MG Oral Cap 60 capsule 0     Sig: Take 2 capsules (0.8 mg total) by mouth daily.        Genitourinary Medications Passed - 8/15/2023  3:51 PM        Passed - Patient does not have pulmonary hypertension on problem list        Passed - In person appointment or virtual visit in the past 12 mos or appointment in next 3 mos Recent Outpatient Visits              5 months ago Erectile dysfunction, unspecified erectile dysfunction type    [facility], [address], [place] Cecy Patient, APRN    Office Visit    6 months ago Colon cancer screening    [facility], [address] [facility], APRN    Office Visit    6 months ago BPH with obstruction/lower urinary tract symptoms    [facility], [address], Sondra Bryan MD    Office Visit    7 months ago Primary osteoarthritis of right knee    [facility], [address], Carmen Simmons MD    Office Visit    8 months ago Encounter for annual health examination    [facility], [address], Tre Krishnamurthy MD    Office Visit          Future Appointments         Provider Department Appt Notes    In 3 days MD Bernard Roberto Addison follow up                  Future Appointments         Provider Department Appt Notes    In 3 days MD Vanna Roberto Acevedo, [address], [place] follow up          Recent Outpatient Visits              5 months ago Erectile dysfunction, unspecified erectile dysfunction type    [facility], [address], [place] Cec[place] Patient, APRN    Office Visit    6 months ago Colon cancer screening    [facility], [address] [facility], APRN    Office Visit    6 months ago BPH with obstruction/lower urinary tract symptoms    Sondra Velázquez MD    Office Visit    7 months ago Primary osteoarthritis of right knee    Carmen Velázquez MD    Office Visit    8 months ago Encounter for annual health examination    [facility] Pe[facility], Tre Krishnamurthy MD    Office Visit

## 2023-08-17 RX ORDER — TAMSULOSIN HYDROCHLORIDE 0.4 MG/1
0.8 CAPSULE ORAL DAILY
Qty: 60 CAPSULE | Refills: 0 | Status: SHIPPED | OUTPATIENT
Start: 2023-08-17 | End: 2023-09-16

## 2023-08-18 ENCOUNTER — OFFICE VISIT (OUTPATIENT)
Dept: FAMILY MEDICINE CLINIC | Facility: CLINIC | Age: 68
End: 2023-08-18

## 2023-08-18 VITALS
WEIGHT: 179 LBS | DIASTOLIC BLOOD PRESSURE: 71 MMHG | BODY MASS INDEX: 26 KG/M2 | TEMPERATURE: 97 F | SYSTOLIC BLOOD PRESSURE: 131 MMHG | HEART RATE: 76 BPM

## 2023-08-18 DIAGNOSIS — K63.5 POLYP OF COLON, UNSPECIFIED PART OF COLON, UNSPECIFIED TYPE: Primary | ICD-10-CM

## 2023-08-18 DIAGNOSIS — Z23 NEED FOR PNEUMOCOCCAL VACCINE: ICD-10-CM

## 2023-08-18 DIAGNOSIS — G89.29 CHRONIC PAIN OF RIGHT KNEE: ICD-10-CM

## 2023-08-18 DIAGNOSIS — N13.8 BPH WITH OBSTRUCTION/LOWER URINARY TRACT SYMPTOMS: ICD-10-CM

## 2023-08-18 DIAGNOSIS — N40.1 BPH WITH OBSTRUCTION/LOWER URINARY TRACT SYMPTOMS: ICD-10-CM

## 2023-08-18 DIAGNOSIS — M25.561 CHRONIC PAIN OF RIGHT KNEE: ICD-10-CM

## 2023-10-08 DIAGNOSIS — E55.9 VITAMIN D DEFICIENCY: ICD-10-CM

## 2023-10-09 RX ORDER — TAMSULOSIN HYDROCHLORIDE 0.4 MG/1
0.8 CAPSULE ORAL DAILY
Qty: 180 CAPSULE | Refills: 3 | Status: SHIPPED | OUTPATIENT
Start: 2023-10-09

## 2023-10-10 RX ORDER — MULTIVITAMIN
1 TABLET ORAL DAILY
Qty: 90 TABLET | Refills: 3 | Status: SHIPPED | OUTPATIENT
Start: 2023-10-10

## 2023-10-10 NOTE — TELEPHONE ENCOUNTER
Review pended refill request as it does not fall under a protocol. MULTIVITAMIN Oral Tab [Pharmacy Med Name: MULTIVITAMIN TABLETS] 90 tablet 2     Sig: TAKE 1 TABLET BY MOUTH EVERY DAY       There is no refill protocol information for this order        Refill passed per [place], [facility] protocol.   Requested Prescriptions   Pending Prescriptions Disp Refills    TAMSULOSIN 0.4 MG Oral Cap [Pharmacy Med Name: TAMSULOSIN 0.4MG CAPSULES] 60 capsule 0     Sig: TAKE 2 CAPSULES BY MOUTH DAILY       Genitourinary Medications Passed - 10/8/2023 12:05 PM        Passed - Patient does not have pulmonary hypertension on problem list        Passed - In person appointment or virtual visit in the past 12 mos or appointment in next 3 mos     Recent Outpatient Visits              1 month ago Polyp of colon, unspecified part of colon, unspecified type    [facility], [address], Viktor Boateng MD    Office Visit    7 months ago Erectile dysfunction, unspecified erectile dysfunction type    [facility], [facility], [place] Hess Miguel, BRET    Office Visit    8 months ago Colon cancer screening    [facility], [address] [place] [address], APRMOE    Office Visit    8 months ago BPH with obstruction/lower urinary tract symptoms    [facility], Rakel Dawson MD    Office Visit    9 months ago Primary osteoarthritis of right knee    [address] [facility],[address], [address] [facility], Yomi Bell MD    Office Visit          Future Appointments         Provider Department Appt Notes    In 2 weeks Silvia Hernandez MD [facility], [address] [facility] right knee   would like to know whatt options he has for treatment

## 2023-10-13 ENCOUNTER — TELEPHONE (OUTPATIENT)
Dept: FAMILY MEDICINE CLINIC | Facility: CLINIC | Age: 68
End: 2023-10-13

## 2023-10-24 ENCOUNTER — OFFICE VISIT (OUTPATIENT)
Dept: ORTHOPEDICS CLINIC | Facility: CLINIC | Age: 68
End: 2023-10-24

## 2023-10-24 VITALS — WEIGHT: 180 LBS | HEIGHT: 68 IN | BODY MASS INDEX: 27.28 KG/M2

## 2023-10-24 DIAGNOSIS — M17.12 PRIMARY OSTEOARTHRITIS OF LEFT KNEE: ICD-10-CM

## 2023-10-24 DIAGNOSIS — M25.561 RIGHT KNEE PAIN, UNSPECIFIED CHRONICITY: ICD-10-CM

## 2023-10-24 DIAGNOSIS — M17.11 PRIMARY OSTEOARTHRITIS OF RIGHT KNEE: Primary | ICD-10-CM

## 2023-10-24 PROCEDURE — 99213 OFFICE O/P EST LOW 20 MIN: CPT | Performed by: ORTHOPAEDIC SURGERY

## 2023-10-30 ENCOUNTER — TELEPHONE (OUTPATIENT)
Dept: ORTHOPEDICS CLINIC | Facility: CLINIC | Age: 68
End: 2023-10-30

## 2023-10-30 NOTE — TELEPHONE ENCOUNTER
Using language line Starr Gandhi ID # [id number], Left message for patient to schedule his MRI if he's planning to have surgery.

## 2023-11-17 ENCOUNTER — TELEPHONE (OUTPATIENT)
Dept: FAMILY MEDICINE CLINIC | Facility: CLINIC | Age: 68
End: 2023-11-17

## 2023-11-17 NOTE — TELEPHONE ENCOUNTER
Patient needs a Pre-Op Clearance Visit; Dr Bee Casillas is doing surgery 12-8-23    Call him with an appt at:    [phone], okay to leave a voice message    ** He is worried we won't call back.

## 2023-11-21 ENCOUNTER — OFFICE VISIT (OUTPATIENT)
Dept: FAMILY MEDICINE CLINIC | Facility: CLINIC | Age: 68
End: 2023-11-21

## 2023-11-21 ENCOUNTER — HOSPITAL ENCOUNTER (OUTPATIENT)
Dept: GENERAL RADIOLOGY | Age: 68
Discharge: HOME | End: 2023-11-21
Attending: FAMILY MEDICINE
Payer: MEDICARE

## 2023-11-21 ENCOUNTER — EKG ENCOUNTER (OUTPATIENT)
Dept: LAB | Age: 68
End: 2023-11-21
Attending: FAMILY MEDICINE
Payer: MEDICARE

## 2023-11-21 ENCOUNTER — LAB ENCOUNTER (OUTPATIENT)
Dept: LAB | Age: 68
End: 2023-11-21
Attending: FAMILY MEDICINE
Payer: MEDICARE

## 2023-11-21 VITALS
SYSTOLIC BLOOD PRESSURE: 119 MMHG | WEIGHT: 179 LBS | HEART RATE: 67 BPM | BODY MASS INDEX: 27 KG/M2 | DIASTOLIC BLOOD PRESSURE: 72 MMHG

## 2023-11-21 DIAGNOSIS — D69.6 THROMBOCYTOPENIA (HCC): ICD-10-CM

## 2023-11-21 DIAGNOSIS — Z01.812 ENCOUNTER FOR PREPROCEDURAL LABORATORY EXAMINATION: ICD-10-CM

## 2023-11-21 DIAGNOSIS — Z01.812 ENCOUNTER FOR PREPROCEDURAL LABORATORY EXAMINATION: Primary | ICD-10-CM

## 2023-11-21 DIAGNOSIS — N30.00 ACUTE CYSTITIS WITHOUT HEMATURIA: ICD-10-CM

## 2023-11-21 DIAGNOSIS — Z01.818 PREOP EXAMINATION: ICD-10-CM

## 2023-11-21 LAB
ALBUMIN SERPL-MCNC: 4.2 G/DL (ref 3.2–4.8)
ALBUMIN/GLOB SERPL: 1.5 {RATIO} (ref 1–2)
ALP LIVER SERPL-CCNC: 70 U/L
ALT SERPL-CCNC: 36 U/L
ANION GAP SERPL CALC-SCNC: 5 MMOL/L (ref 0–18)
APTT PPP: 29 SECONDS (ref 23.3–35.6)
AST SERPL-CCNC: 31 U/L (ref ?–34)
ATRIAL RATE: 62 BPM
BASOPHILS # BLD AUTO: 0.03 X10(3) UL (ref 0–0.2)
BASOPHILS NFR BLD AUTO: 0.6 %
BILIRUB SERPL-MCNC: 0.6 MG/DL (ref 0.2–1.1)
BILIRUB UR QL: NEGATIVE
BUN BLD-MCNC: 12 MG/DL (ref 9–23)
BUN/CREAT SERPL: 12.8 (ref 10–20)
CALCIUM BLD-MCNC: 9.4 MG/DL (ref 8.7–10.4)
CHLORIDE SERPL-SCNC: 106 MMOL/L (ref 98–112)
CLARITY UR: CLEAR
CO2 SERPL-SCNC: 28 MMOL/L (ref 21–32)
CREAT BLD-MCNC: 0.94 MG/DL
DEPRECATED RDW RBC AUTO: 46.1 FL (ref 35.1–46.3)
EGFRCR SERPLBLD CKD-EPI 2021: 88 ML/MIN/1.73M2 (ref 60–?)
EOSINOPHIL # BLD AUTO: 0.08 X10(3) UL (ref 0–0.7)
EOSINOPHIL NFR BLD AUTO: 1.6 %
ERYTHROCYTE [DISTWIDTH] IN BLOOD BY AUTOMATED COUNT: 13.4 % (ref 11–15)
FASTING STATUS PATIENT QL REPORTED: YES
GLOBULIN PLAS-MCNC: 2.8 G/DL (ref 2.8–4.4)
GLUCOSE BLD-MCNC: 100 MG/DL (ref 70–99)
GLUCOSE UR-MCNC: NORMAL MG/DL
HCT VFR BLD AUTO: 43.8 %
HGB BLD-MCNC: 15.1 G/DL
HGB UR QL STRIP.AUTO: NEGATIVE
IMM GRANULOCYTES # BLD AUTO: 0.01 X10(3) UL (ref 0–1)
IMM GRANULOCYTES NFR BLD: 0.2 %
INR BLD: 0.94 (ref 0.8–1.2)
KETONES UR-MCNC: NEGATIVE MG/DL
LEUKOCYTE ESTERASE UR QL STRIP.AUTO: NEGATIVE
LYMPHOCYTES # BLD AUTO: 1.55 X10(3) UL (ref 1–4)
LYMPHOCYTES NFR BLD AUTO: 30.2 %
MCH RBC QN AUTO: 32.4 PG (ref 26–34)
MCHC RBC AUTO-ENTMCNC: 34.5 G/DL (ref 31–37)
MCV RBC AUTO: 94 FL
MONOCYTES # BLD AUTO: 0.5 X10(3) UL (ref 0.1–1)
MONOCYTES NFR BLD AUTO: 9.7 %
NEUTROPHILS # BLD AUTO: 2.97 X10 (3) UL (ref 1.5–7.7)
NEUTROPHILS # BLD AUTO: 2.97 X10(3) UL (ref 1.5–7.7)
NEUTROPHILS NFR BLD AUTO: 57.7 %
NITRITE UR QL STRIP.AUTO: NEGATIVE
OSMOLALITY SERPL CALC.SUM OF ELEC: 288 MOSM/KG (ref 275–295)
P AXIS: 44 DEGREES
P-R INTERVAL: 164 MS
PH UR: 5 [PH] (ref 5–8)
PLATELET # BLD AUTO: 153 10(3)UL (ref 150–450)
POTASSIUM SERPL-SCNC: 4.9 MMOL/L (ref 3.5–5.1)
PROT SERPL-MCNC: 7 G/DL (ref 5.7–8.2)
PROT UR-MCNC: NEGATIVE MG/DL
PROTHROMBIN TIME: 13.1 SECONDS (ref 11.6–14.8)
Q-T INTERVAL: 374 MS
QRS DURATION: 76 MS
QTC CALCULATION (BEZET): 379 MS
R AXIS: 6 DEGREES
RBC # BLD AUTO: 4.66 X10(6)UL
SODIUM SERPL-SCNC: 139 MMOL/L (ref 136–145)
SP GR UR STRIP: 1.02 (ref 1–1.03)
T AXIS: 25 DEGREES
UROBILINOGEN UR STRIP-ACNC: NORMAL
VENTRICULAR RATE: 62 BPM
WBC # BLD AUTO: 5.1 X10(3) UL (ref 4–11)

## 2023-11-21 PROCEDURE — 36415 COLL VENOUS BLD VENIPUNCTURE: CPT

## 2023-11-21 PROCEDURE — 99214 OFFICE O/P EST MOD 30 MIN: CPT | Performed by: FAMILY MEDICINE

## 2023-11-21 PROCEDURE — 81003 URINALYSIS AUTO W/O SCOPE: CPT

## 2023-11-21 PROCEDURE — 93010 ELECTROCARDIOGRAM REPORT: CPT | Performed by: INTERNAL MEDICINE

## 2023-11-21 PROCEDURE — 80053 COMPREHEN METABOLIC PANEL: CPT

## 2023-11-21 PROCEDURE — 85610 PROTHROMBIN TIME: CPT

## 2023-11-21 PROCEDURE — 85025 COMPLETE CBC W/AUTO DIFF WBC: CPT

## 2023-11-21 PROCEDURE — 87641 MR-STAPH DNA AMP PROBE: CPT

## 2023-11-21 PROCEDURE — 71046 X-RAY EXAM CHEST 2 VIEWS: CPT | Performed by: FAMILY MEDICINE

## 2023-11-21 PROCEDURE — 93005 ELECTROCARDIOGRAM TRACING: CPT

## 2023-11-21 PROCEDURE — 87086 URINE CULTURE/COLONY COUNT: CPT

## 2023-11-21 PROCEDURE — 85730 THROMBOPLASTIN TIME PARTIAL: CPT

## 2023-11-22 LAB — MRSA DNA SPEC QL NAA+PROBE: NEGATIVE

## 2023-12-09 ENCOUNTER — TELEPHONE (OUTPATIENT)
Dept: ORTHOPEDICS CLINIC | Facility: CLINIC | Age: 68
End: 2023-12-09

## 2023-12-16 ENCOUNTER — TELEPHONE (OUTPATIENT)
Dept: ORTHOPEDICS CLINIC | Facility: CLINIC | Age: 68
End: 2023-12-16

## 2023-12-21 ENCOUNTER — HOSPITAL ENCOUNTER (OUTPATIENT)
Facility: HOSPITAL | Age: 68
Discharge: HOME | End: 2023-12-22
Attending: ORTHOPAEDIC SURGERY | Admitting: ORTHOPAEDIC SURGERY
Payer: MEDICARE

## 2023-12-21 ENCOUNTER — APPOINTMENT (OUTPATIENT)
Dept: GENERAL RADIOLOGY | Facility: HOSPITAL | Age: 68
End: 2023-12-21
Attending: ORTHOPAEDIC SURGERY
Payer: MEDICARE

## 2023-12-21 ENCOUNTER — ANESTHESIA EVENT (OUTPATIENT)
Dept: SURGERY | Facility: HOSPITAL | Age: 68
End: 2023-12-21
Payer: MEDICARE

## 2023-12-21 ENCOUNTER — ANESTHESIA (OUTPATIENT)
Dept: SURGERY | Facility: HOSPITAL | Age: 68
End: 2023-12-21
Payer: MEDICARE

## 2023-12-21 DIAGNOSIS — M17.11 PRIMARY OSTEOARTHRITIS OF RIGHT KNEE: ICD-10-CM

## 2023-12-21 PROCEDURE — 99204 OFFICE O/P NEW MOD 45 MIN: CPT | Performed by: HOSPITALIST

## 2023-12-21 PROCEDURE — 8E0YXBH COMPUTER ASSISTED PROCEDURE OF LOWER EXTREMITY, WITH MAGNETIC RESONANCE IMAGING: ICD-10-PCS | Performed by: ORTHOPAEDIC SURGERY

## 2023-12-21 PROCEDURE — 0SRC0J9 REPLACEMENT OF RIGHT KNEE JOINT WITH SYNTHETIC SUBSTITUTE, CEMENTED, OPEN APPROACH: ICD-10-PCS | Performed by: ORTHOPAEDIC SURGERY

## 2023-12-21 PROCEDURE — 73560 X-RAY EXAM OF KNEE 1 OR 2: CPT | Performed by: ORTHOPAEDIC SURGERY

## 2023-12-21 DEVICE — IMPLANTABLE DEVICE
Type: IMPLANTABLE DEVICE | Site: KNEE | Status: FUNCTIONAL
Brand: PERSONA®

## 2023-12-21 DEVICE — IMPLANTABLE DEVICE
Type: IMPLANTABLE DEVICE | Site: KNEE | Status: FUNCTIONAL
Brand: PERSONA® NATURAL TIBIA®

## 2023-12-21 DEVICE — CEMENT BNE 40GM W/ GENT HI VISC RADPQ FOR REV: Type: IMPLANTABLE DEVICE | Site: KNEE | Status: FUNCTIONAL

## 2023-12-21 DEVICE — IMPLANTABLE DEVICE: Type: IMPLANTABLE DEVICE | Site: KNEE | Status: FUNCTIONAL

## 2023-12-21 RX ORDER — NALOXONE HYDROCHLORIDE 0.4 MG/ML
0.08 INJECTION, SOLUTION INTRAMUSCULAR; INTRAVENOUS; SUBCUTANEOUS
Status: DISCONTINUED | OUTPATIENT
Start: 2023-12-21 | End: 2023-12-22

## 2023-12-21 RX ORDER — HALOPERIDOL 5 MG/ML
0.5 INJECTION INTRAMUSCULAR ONCE AS NEEDED
Status: ACTIVE | OUTPATIENT
Start: 2023-12-21 | End: 2023-12-21

## 2023-12-21 RX ORDER — ACETAMINOPHEN 500 MG
1000 TABLET ORAL ONCE
Status: COMPLETED | OUTPATIENT
Start: 2023-12-21 | End: 2023-12-21

## 2023-12-21 RX ORDER — NALBUPHINE HYDROCHLORIDE 10 MG/ML
2.5 INJECTION, SOLUTION INTRAMUSCULAR; INTRAVENOUS; SUBCUTANEOUS EVERY 4 HOURS PRN
Status: DISCONTINUED | OUTPATIENT
Start: 2023-12-21 | End: 2023-12-22

## 2023-12-21 RX ORDER — CEFAZOLIN SODIUM/WATER 2 G/20 ML
2 SYRINGE (ML) INTRAVENOUS EVERY 8 HOURS
Qty: 40 ML | Refills: 0 | Status: COMPLETED | OUTPATIENT
Start: 2023-12-21 | End: 2023-12-21

## 2023-12-21 RX ORDER — MORPHINE SULFATE 10 MG/ML
6 INJECTION, SOLUTION INTRAMUSCULAR; INTRAVENOUS EVERY 10 MIN PRN
Status: DISCONTINUED | OUTPATIENT
Start: 2023-12-21 | End: 2023-12-21 | Stop reason: HOSPADM

## 2023-12-21 RX ORDER — HYDROMORPHONE HYDROCHLORIDE 1 MG/ML
0.6 INJECTION, SOLUTION INTRAMUSCULAR; INTRAVENOUS; SUBCUTANEOUS EVERY 5 MIN PRN
Status: DISCONTINUED | OUTPATIENT
Start: 2023-12-21 | End: 2023-12-21 | Stop reason: HOSPADM

## 2023-12-21 RX ORDER — MIDAZOLAM HYDROCHLORIDE 1 MG/ML
INJECTION INTRAMUSCULAR; INTRAVENOUS AS NEEDED
Status: DISCONTINUED | OUTPATIENT
Start: 2023-12-21 | End: 2023-12-21 | Stop reason: SURG

## 2023-12-21 RX ORDER — PSEUDOEPHEDRINE HCL 30 MG
100 TABLET ORAL 2 TIMES DAILY
Qty: 20 CAPSULE | Refills: 0 | Status: SHIPPED | OUTPATIENT
Start: 2023-12-21

## 2023-12-21 RX ORDER — TRANEXAMIC ACID 10 MG/ML
1000 INJECTION, SOLUTION INTRAVENOUS ONCE
Status: DISCONTINUED | OUTPATIENT
Start: 2023-12-22 | End: 2023-12-21 | Stop reason: HOSPADM

## 2023-12-21 RX ORDER — POLYETHYLENE GLYCOL 3350 17 G/17G
17 POWDER, FOR SOLUTION ORAL DAILY PRN
Status: DISCONTINUED | OUTPATIENT
Start: 2023-12-21 | End: 2023-12-22

## 2023-12-21 RX ORDER — HYDROMORPHONE HYDROCHLORIDE 1 MG/ML
0.4 INJECTION, SOLUTION INTRAMUSCULAR; INTRAVENOUS; SUBCUTANEOUS EVERY 5 MIN PRN
Status: DISCONTINUED | OUTPATIENT
Start: 2023-12-21 | End: 2023-12-21 | Stop reason: HOSPADM

## 2023-12-21 RX ORDER — ENEMA 19; 7 G/133ML; G/133ML
1 ENEMA RECTAL ONCE AS NEEDED
Status: DISCONTINUED | OUTPATIENT
Start: 2023-12-21 | End: 2023-12-22

## 2023-12-21 RX ORDER — MORPHINE SULFATE 1 MG/ML
INJECTION, SOLUTION EPIDURAL; INTRATHECAL; INTRAVENOUS
Status: COMPLETED | OUTPATIENT
Start: 2023-12-21 | End: 2023-12-21

## 2023-12-21 RX ORDER — PROCHLORPERAZINE EDISYLATE 5 MG/ML
5 INJECTION INTRAMUSCULAR; INTRAVENOUS ONCE AS NEEDED
Status: ACTIVE | OUTPATIENT
Start: 2023-12-21 | End: 2023-12-21

## 2023-12-21 RX ORDER — SENNOSIDES 8.6 MG
17.2 TABLET ORAL NIGHTLY
Status: DISCONTINUED | OUTPATIENT
Start: 2023-12-21 | End: 2023-12-22

## 2023-12-21 RX ORDER — HYDROMORPHONE HYDROCHLORIDE 1 MG/ML
0.4 INJECTION, SOLUTION INTRAMUSCULAR; INTRAVENOUS; SUBCUTANEOUS
Status: DISCONTINUED | OUTPATIENT
Start: 2023-12-21 | End: 2023-12-22

## 2023-12-21 RX ORDER — CEFAZOLIN SODIUM/WATER 2 G/20 ML
2 SYRINGE (ML) INTRAVENOUS ONCE
Status: COMPLETED | OUTPATIENT
Start: 2023-12-21 | End: 2023-12-21

## 2023-12-21 RX ORDER — NALOXONE HYDROCHLORIDE 0.4 MG/ML
80 INJECTION, SOLUTION INTRAMUSCULAR; INTRAVENOUS; SUBCUTANEOUS AS NEEDED
Status: DISCONTINUED | OUTPATIENT
Start: 2023-12-21 | End: 2023-12-21 | Stop reason: HOSPADM

## 2023-12-21 RX ORDER — BUPIVACAINE HYDROCHLORIDE 7.5 MG/ML
INJECTION, SOLUTION INTRASPINAL
Status: COMPLETED | OUTPATIENT
Start: 2023-12-21 | End: 2023-12-21

## 2023-12-21 RX ORDER — HYDROCODONE BITARTRATE AND ACETAMINOPHEN 7.5; 325 MG/1; MG/1
2 TABLET ORAL EVERY 6 HOURS PRN
Status: DISCONTINUED | OUTPATIENT
Start: 2023-12-21 | End: 2023-12-22

## 2023-12-21 RX ORDER — METOCLOPRAMIDE HYDROCHLORIDE 5 MG/ML
10 INJECTION INTRAMUSCULAR; INTRAVENOUS EVERY 8 HOURS PRN
Status: DISCONTINUED | OUTPATIENT
Start: 2023-12-21 | End: 2023-12-22

## 2023-12-21 RX ORDER — MORPHINE SULFATE 4 MG/ML
4 INJECTION, SOLUTION INTRAMUSCULAR; INTRAVENOUS EVERY 10 MIN PRN
Status: DISCONTINUED | OUTPATIENT
Start: 2023-12-21 | End: 2023-12-21 | Stop reason: HOSPADM

## 2023-12-21 RX ORDER — DOXEPIN HYDROCHLORIDE 50 MG/1
1 CAPSULE ORAL DAILY
Status: DISCONTINUED | OUTPATIENT
Start: 2023-12-21 | End: 2023-12-22

## 2023-12-21 RX ORDER — ASPIRIN 325 MG
325 TABLET, DELAYED RELEASE (ENTERIC COATED) ORAL 2 TIMES DAILY
Qty: 60 TABLET | Refills: 0 | Status: SHIPPED | OUTPATIENT
Start: 2023-12-22

## 2023-12-21 RX ORDER — ENOXAPARIN SODIUM 100 MG/ML
40 INJECTION SUBCUTANEOUS ONCE
Status: COMPLETED | OUTPATIENT
Start: 2023-12-21 | End: 2023-12-21

## 2023-12-21 RX ORDER — ONDANSETRON 2 MG/ML
4 INJECTION INTRAMUSCULAR; INTRAVENOUS ONCE AS NEEDED
Status: ACTIVE | OUTPATIENT
Start: 2023-12-21 | End: 2023-12-21

## 2023-12-21 RX ORDER — DIPHENHYDRAMINE HCL 25 MG
25 CAPSULE ORAL EVERY 4 HOURS PRN
Status: DISCONTINUED | OUTPATIENT
Start: 2023-12-21 | End: 2023-12-22

## 2023-12-21 RX ORDER — ACETAMINOPHEN 325 MG/1
650 TABLET ORAL EVERY 8 HOURS PRN
Qty: 60 TABLET | Refills: 0 | Status: SHIPPED | OUTPATIENT
Start: 2023-12-21

## 2023-12-21 RX ORDER — LIDOCAINE HYDROCHLORIDE 10 MG/ML
INJECTION, SOLUTION INFILTRATION; PERINEURAL
Status: COMPLETED | OUTPATIENT
Start: 2023-12-21 | End: 2023-12-21

## 2023-12-21 RX ORDER — ONDANSETRON 2 MG/ML
4 INJECTION INTRAMUSCULAR; INTRAVENOUS EVERY 6 HOURS PRN
Status: DISCONTINUED | OUTPATIENT
Start: 2023-12-21 | End: 2023-12-22

## 2023-12-21 RX ORDER — ACETAMINOPHEN 325 MG/1
650 TABLET ORAL EVERY 8 HOURS PRN
Status: DISCONTINUED | OUTPATIENT
Start: 2023-12-21 | End: 2023-12-22

## 2023-12-21 RX ORDER — GUARN/MA-HUANG/P.GIN/S.GINSENG
1 TABLET ORAL 2 TIMES DAILY WITH MEALS
Qty: 60 TABLET | Refills: 0 | Status: SHIPPED | OUTPATIENT
Start: 2023-12-21

## 2023-12-21 RX ORDER — PANTOPRAZOLE SODIUM 40 MG/1
40 TABLET, DELAYED RELEASE ORAL
Status: DISCONTINUED | OUTPATIENT
Start: 2023-12-22 | End: 2023-12-22

## 2023-12-21 RX ORDER — HYDROMORPHONE HYDROCHLORIDE 1 MG/ML
0.6 INJECTION, SOLUTION INTRAMUSCULAR; INTRAVENOUS; SUBCUTANEOUS
Status: DISCONTINUED | OUTPATIENT
Start: 2023-12-21 | End: 2023-12-22

## 2023-12-21 RX ORDER — DIPHENHYDRAMINE HYDROCHLORIDE 50 MG/ML
12.5 INJECTION INTRAMUSCULAR; INTRAVENOUS EVERY 4 HOURS PRN
Status: DISCONTINUED | OUTPATIENT
Start: 2023-12-21 | End: 2023-12-22

## 2023-12-21 RX ORDER — SODIUM CHLORIDE 9 MG/ML
INJECTION, SOLUTION INTRAVENOUS CONTINUOUS
Status: DISCONTINUED | OUTPATIENT
Start: 2023-12-21 | End: 2023-12-22

## 2023-12-21 RX ORDER — HYDROCODONE BITARTRATE AND ACETAMINOPHEN 7.5; 325 MG/1; MG/1
1 TABLET ORAL EVERY 6 HOURS PRN
Qty: 25 TABLET | Refills: 0 | Status: SHIPPED | OUTPATIENT
Start: 2023-12-21 | End: 2023-12-22

## 2023-12-21 RX ORDER — DIPHENHYDRAMINE HYDROCHLORIDE 50 MG/ML
25 INJECTION INTRAMUSCULAR; INTRAVENOUS ONCE AS NEEDED
Status: COMPLETED | OUTPATIENT
Start: 2023-12-21 | End: 2023-12-21

## 2023-12-21 RX ORDER — SODIUM CHLORIDE, SODIUM LACTATE, POTASSIUM CHLORIDE, CALCIUM CHLORIDE 600; 310; 30; 20 MG/100ML; MG/100ML; MG/100ML; MG/100ML
INJECTION, SOLUTION INTRAVENOUS CONTINUOUS
Status: DISCONTINUED | OUTPATIENT
Start: 2023-12-21 | End: 2023-12-21

## 2023-12-21 RX ORDER — HYDROMORPHONE HYDROCHLORIDE 1 MG/ML
0.2 INJECTION, SOLUTION INTRAMUSCULAR; INTRAVENOUS; SUBCUTANEOUS EVERY 5 MIN PRN
Status: DISCONTINUED | OUTPATIENT
Start: 2023-12-21 | End: 2023-12-21 | Stop reason: HOSPADM

## 2023-12-21 RX ORDER — HYDROCODONE BITARTRATE AND ACETAMINOPHEN 7.5; 325 MG/1; MG/1
1 TABLET ORAL EVERY 6 HOURS PRN
Status: DISCONTINUED | OUTPATIENT
Start: 2023-12-21 | End: 2023-12-22

## 2023-12-21 RX ORDER — TRANEXAMIC ACID 10 MG/ML
INJECTION, SOLUTION INTRAVENOUS AS NEEDED
Status: DISCONTINUED | OUTPATIENT
Start: 2023-12-21 | End: 2023-12-21 | Stop reason: SURG

## 2023-12-21 RX ORDER — ACETAMINOPHEN 325 MG/1
650 TABLET ORAL EVERY 6 HOURS PRN
Status: DISCONTINUED | OUTPATIENT
Start: 2023-12-21 | End: 2023-12-22

## 2023-12-21 RX ORDER — ASPIRIN 325 MG
325 TABLET, DELAYED RELEASE (ENTERIC COATED) ORAL 2 TIMES DAILY
Status: DISCONTINUED | OUTPATIENT
Start: 2023-12-21 | End: 2023-12-22

## 2023-12-21 RX ORDER — SODIUM CHLORIDE, SODIUM LACTATE, POTASSIUM CHLORIDE, CALCIUM CHLORIDE 600; 310; 30; 20 MG/100ML; MG/100ML; MG/100ML; MG/100ML
INJECTION, SOLUTION INTRAVENOUS CONTINUOUS
Status: DISCONTINUED | OUTPATIENT
Start: 2023-12-21 | End: 2023-12-21 | Stop reason: HOSPADM

## 2023-12-21 RX ORDER — BISACODYL 10 MG
10 SUPPOSITORY, RECTAL RECTAL
Status: DISCONTINUED | OUTPATIENT
Start: 2023-12-21 | End: 2023-12-22

## 2023-12-21 RX ORDER — TAMSULOSIN HYDROCHLORIDE 0.4 MG/1
0.8 CAPSULE ORAL NIGHTLY
Status: DISCONTINUED | OUTPATIENT
Start: 2023-12-21 | End: 2023-12-22

## 2023-12-21 RX ORDER — MORPHINE SULFATE 4 MG/ML
2 INJECTION, SOLUTION INTRAMUSCULAR; INTRAVENOUS EVERY 10 MIN PRN
Status: DISCONTINUED | OUTPATIENT
Start: 2023-12-21 | End: 2023-12-21 | Stop reason: HOSPADM

## 2023-12-21 RX ORDER — DOCUSATE SODIUM 100 MG/1
100 CAPSULE, LIQUID FILLED ORAL 2 TIMES DAILY
Status: DISCONTINUED | OUTPATIENT
Start: 2023-12-21 | End: 2023-12-22

## 2023-12-21 RX ADMIN — SODIUM CHLORIDE, SODIUM LACTATE, POTASSIUM CHLORIDE, CALCIUM CHLORIDE: 600; 310; 30; 20 INJECTION, SOLUTION INTRAVENOUS at 10:35:00

## 2023-12-21 RX ADMIN — MORPHINE SULFATE 0.2 MG: 1 INJECTION, SOLUTION EPIDURAL; INTRATHECAL; INTRAVENOUS at 07:45:00

## 2023-12-21 RX ADMIN — TRANEXAMIC ACID 1000 MG: 10 INJECTION, SOLUTION INTRAVENOUS at 09:55:00

## 2023-12-21 RX ADMIN — SODIUM CHLORIDE, SODIUM LACTATE, POTASSIUM CHLORIDE, CALCIUM CHLORIDE: 600; 310; 30; 20 INJECTION, SOLUTION INTRAVENOUS at 09:45:00

## 2023-12-21 RX ADMIN — BUPIVACAINE HYDROCHLORIDE 1.5 ML: 7.5 INJECTION, SOLUTION INTRASPINAL at 07:45:00

## 2023-12-21 RX ADMIN — SODIUM CHLORIDE, SODIUM LACTATE, POTASSIUM CHLORIDE, CALCIUM CHLORIDE: 600; 310; 30; 20 INJECTION, SOLUTION INTRAVENOUS at 07:40:00

## 2023-12-21 RX ADMIN — CEFAZOLIN SODIUM/WATER 2 G: 2 G/20 ML SYRINGE (ML) INTRAVENOUS at 08:05:00

## 2023-12-21 RX ADMIN — LIDOCAINE HYDROCHLORIDE 5 ML: 10 INJECTION, SOLUTION INFILTRATION; PERINEURAL at 07:45:00

## 2023-12-21 RX ADMIN — TRANEXAMIC ACID 1000 MG: 10 INJECTION, SOLUTION INTRAVENOUS at 08:05:00

## 2023-12-21 RX ADMIN — MIDAZOLAM HYDROCHLORIDE 2 MG: 1 INJECTION INTRAMUSCULAR; INTRAVENOUS at 07:40:00

## 2023-12-21 NOTE — CONSULTS
[facility]    PATIENT'S NAME: Atif Weber   ATTENDING PHYSICIAN: [address] MBerenice Varela MD   CONSULTING PHYSICIAN: Luana Sharma MD   PATIENT ACCOUNT#:   [id number]    LOCATION:  [address]  MEDICAL RECORD #:   [id number]       YOB: 1955  ADMISSION DATE:       12/21/2023      CONSULT DATE:  12/21/2023    REPORT OF CONSULTATION      REASON FOR ADMISSION:  Right total knee arthroplasty. REASON FOR CONSULTATION:  Medical comanagement. HISTORY OF PRESENT ILLNESS:  Patient is a 69-year-old  male with chronic bilateral knee pain and bilateral knee osteoarthritis, right worse than left. Failed outpatient conservative medical management options and evaluated by Orthopedic Surgery, Dr. Mera Jones, and scheduled today for above-mentioned procedure. Preoperatively, he had spinal block. Postoperatively, transferred to PACU for further monitoring. PAST MEDICAL HISTORY:  Benign prostatic hypertrophy, anxiety, and generalized osteoarthritis. PAST SURGICAL HISTORY:  Appendectomy, bilateral knee steroid injections. MEDICATIONS:  Please see medication reconciliation list.     ALLERGIES:  No known drug allergies. SOCIAL HISTORY:  Ex-tobacco user. No current tobacco or drug use. Social alcohol. Lives with his family. Independent in his basic activities of daily living. FAMILY HISTORY:  Positive for hypertension. REVIEW OF SYSTEMS:  Currently resting in bed. No right knee pain. No chest pain. No shortness of breath. Other 12-point review of systems is negative. PHYSICAL EXAMINATION:    GENERAL:  Alert and oriented to time, place and person. No acute distress. VITAL SIGNS:  Temperature 97.3, pulse 54, respiratory rate 17, blood pressure 109/65, pulse ox 98% on room air. HEENT:  Atraumatic. Oropharynx clear. Moist mucous membranes. Normal hard and soft palate. Eyes:  Anicteric sclerae. NECK:  Supple. No lymphadenopathy. Trachea midline. Full range of motion. LUNGS:  Clear to auscultation bilaterally. Normal respiratory effort. HEART:  Regular rate and rhythm. S1 and S2 auscultated. No murmur. ABDOMEN:  Soft, nondistended. No tenderness. Positive bowel sounds. EXTREMITIES:  Right knee dressing. No leg edema. No clubbing or cyanosis. NEUROLOGIC:  Decreased sensation and muscle movement in both lower extremities, consistent with postspinal block. ASSESSMENT AND PLAN:    1. Primary osteoarthritis, right knee, status post right total knee arthroplasty, spinal block. Pain control. Neurovascular checks. Aspirin for DVT prophylaxis. Physial and occupational therapy. 2.   Enlarged prostate. Continue home medications and monitor for urinary retention.      Dictated By Darlene Marcos MD  d: 12/21/2023 11:55:36  t: 12/21/2023 12:01:55  Job [id number]  DY/

## 2023-12-21 NOTE — INTERVAL H&P NOTE
Pre-op Diagnosis: Primary osteoarthritis of right knee [M17.11]    The above referenced H&P was reviewed by Emiliana Connelly MD on 12/21/2023, the patient was examined and no significant changes have occurred in the patient's condition since the H&P was performed. I discussed with the patient and/or legal representative the potential benefits, risks and side effects of this procedure; the likelihood of the patient achieving goals; and potential problems that might occur during recuperation. I discussed reasonable alternatives to the procedure, including risks, benefits and side effects related to the alternatives and risks related to not receiving this procedure. We will proceed with procedure as planned.

## 2023-12-21 NOTE — CM/SW NOTE
Department  notified of request for [facility], aidin referrals started. Assigned CM/SW to follow up with pt/family on further discharge planning.      ANN MARIE Lujan [facility]

## 2023-12-21 NOTE — BRIEF OP NOTE
Pre-Operative Diagnosis: Primary osteoarthritis of right knee [M17.11]     Post-Operative Diagnosis: Primary osteoarthritis of right knee [M17.11]     Procedure Performed:   Right total knee arthroplasty with MRI templated patient specific instruments    Surgeon(s) and Role:     * Reanna Rodgers MD - Primary    Assistant(s): Hilda Squires PA-C (first assistant); Keven De La Rosa CSA (second assistant)    Anesthesia: Dr. Shaggy Krause with spinal using Duramorph/MAC     Surgical Findings: Emile Persona LPS femur 9 right standard, tibia H right with short stem extension, patella 41 x 10.0 mm, polyethylene 11 mm LPS Vivacit-E. Tourniquet: 81 minutes at 300 mmHg. Specimen: Bone cuts to pathology  Complications: None  Drains: None  Estimated Blood Loss: 75 cc  Stable to PACU. Tranexamic acid 1 g IV second dose given in OR at end of case.     Sandee Marin MD  12/21/2023  7:48 AM

## 2023-12-21 NOTE — PHYSICAL THERAPY NOTE
PHYSICAL THERAPY KNEE EVALUATION - INPATIENT       Room Number: Room 3/Room 3-A  Evaluation Date: 12/21/2023  Type of Evaluation: Initial  Physician Order: PT Eval and Treat    Presenting Problem: right TKA 12/21/23     Reason for Therapy: Mobility Dysfunction and Discharge Planning    PHYSICAL THERAPY ASSESSMENT     Patient is a 76year old male admitted 12/21/2023 for elective right TKA. Patient's current functional deficits include impaired bed mobility, transfers, ambulation and stair negotiation, which are below the patient's pre-admission status. Patient will benefit from continued inpatient physical therapy to address above issues so that patient may achieve highest functional mobility level. Pt recd in supine, spouse present, agreeable to PT. Rn aware of session. Pt transferred supine to sit with supervision, no dizziness, /72. Pt educated in role of PT,  ankle pumps for DVT prevention, quad sets, goals for session. Pt educated in and performed LE therex per TKA protocol, issued written HEP. Pt provided verbal instruction and demonstration of rw use, stood to rw with cga. Pt able to perform right tke, marching in place with no right knee buckling, instructed in gait sequencing. Pt amb with rw with gait training emphasis on heel toe pattern, upright posture. Pt returned to bs chair, educated about POC, discussed dc recommendation. Pt educated in use of towel roll under right ankle to promote right knee terminal extension, educated in importance of achieving ROM in a timely fashion. Discussed session with RN, discussed assist need with PCT. The patient's Approx Degree of Impairment: 46.58% has been calculated based on documentation in the [facility] '6 clicks' Inpatient Basic Mobility Short Form. Research supports that patients with this level of impairment may benefit from home with home PT.    DISCHARGE RECOMMENDATIONS  PT Discharge Recommendations: Home with home health PT; Intermittent Supervision    PLAN  PT Treatment Plan: Endurance; Energy conservation;Patient education;Gait training;Range of motion;Strengthening;Stair training  Rehab Potential : Good  Frequency (Obs): BID       PHYSICAL THERAPY MEDICAL/SOCIAL HISTORY        Problem List  Principal Problem:    Primary osteoarthritis of right knee  Active Problems:    BPH with obstruction/lower urinary tract symptoms      HOME SITUATION  Home Situation  Type of Home: House  Home Layout: One level  Stairs to Enter : 4  Stairs to [employer]: 0  Lives With: Spouse  Drives: Yes  Patient Owned Equipment: None  Patient Regularly Uses: None     Prior Level of [place]: Pt reports ind pta, did not use assistive device. Pt lives with supportive spouse who will be able to assist as needed upon edw dc. SUBJECTIVE  \"I think I will feel this more tomorrow\"    PHYSICAL THERAPY EXAMINATION     OBJECTIVE     Fall Risk: Standard fall risk    WEIGHT BEARING RESTRICTION  Weight Bearing Restriction: R lower extremity        R Lower Extremity: Weight Bearing as Tolerated       PAIN ASSESSMENT  Ratin  Location: denies at this time  Management Techniques: Activity promotion    COGNITION  Overall Cognitive Status:  WFL - within functional limits    RANGE OF MOTION AND STRENGTH ASSESSMENT  Upper extremity ROM and strength are within functional limits   Lower extremity ROM is within functional limits right knee 0-90 degrees  Lower extremity strength is within functional limits     BALANCE  Static Sitting: Good  Dynamic Sitting: Good  Static Standing: Fair -  Dynamic Standing: Fair -  ACTIVITY TOLERANCE           BP: 117/72  BP Location: Right arm  BP Method: Automatic  Patient Position: Sitting    O2 WALK       AM-PAC '6-Clicks' INPATIENT SHORT FORM - BASIC MOBILITY  How much difficulty does the patient currently have. ..   Patient Difficulty: Turning over in bed (including adjusting bedclothes, sheets and blankets)?: A Little   Patient Difficulty: Sitting down on and standing up from a chair with arms (e.g., wheelchair, bedside commode, etc.): A Little   Patient Difficulty: Moving from lying on back to sitting on the side of the bed?: A Little   How much help from another person does the patient currently need. .. Help from Another: Moving to and from a bed to a chair (including a wheelchair)?: A Little   Help from Another: Need to walk in hospital room?: A Little   Help from Another: Climbing 3-5 steps with a railing?: A Little     AM-PAC Score:  Raw Score: 18   Approx Degree of Impairment: 46.58%   Standardized Score (AM-PAC Scale): 43.63   CMS Modifier (G-Code): CK    FUNCTIONAL ABILITY STATUS  Functional Mobility/Gait Assessment  Gait Assistance: Minimum assistance  Distance (ft): 60  Assistive Device: Rolling walker  Pattern: R Decreased stance time    Exercise/Education Provided:  Bed mobility  Energy conservation  Functional activity tolerated  Gait training  ROM  Strengthening    Patient End of Session: Up in chair;Needs met;Call light within reach;RN aware of session/findings; All patient questions and concerns addressed; Ice applied; Family present    CURRENT GOALS    Goals to be met by: 12/22/23  Patient Goal Patient's self-stated goal is: to go home with home PT   Goal #1 Patient is able to demonstrate supine - sit EOB @ level: modified independent     Goal #1   Current Status    Goal #2 Patient is able to demonstrate transfers Sit to/from Stand at assistance level: supervision     Goal #2  Current Status    Goal #3 Patient is able to ambulate 150 feet with assistive device at assistance level: supervision   Goal #3   Current Status    Goal #4 Patient will negotiate 4 stairs/one curb w/ assistive device and supervision   Goal #4   Current Status    Goal #5  AROM 0 degrees extension to 95 degrees flexion     Goal #5   Current Status    Goal #6 Patient independently performs home exercise program for ROM/strengthening per the instructions provided in preparation for discharge.    Goal #6  Current Status        Patient Evaluation Complexity Level:  History Low - no personal factors and/or co-morbidities   Examination of body systems Low - addressing 1-2 elements   Clinical Presentation Low - Stable   Clinical Decision Making Low Complexity     Gait Training: 15 minutes  Therapeutic Activity: 15 minutes

## 2023-12-21 NOTE — ANESTHESIA PROCEDURE NOTES
Spinal Block    Date/Time: 12/21/2023 7:45 AM    Performed by: Bhanu Vargas CRNA  Authorized by: Bhanu Vargas CRNA      General Information and Staff    Start Time:  12/21/2023 7:45 AM  End Time:  12/21/2023 7:54 AM  CRNA:  Bhanu Vargas CRNA  Performed by:  CRNA  Site identification: surface landmarks    Reason for Block: at surgeon's request, post-op pain management and surgical anesthesia    Preanesthetic Checklist: patient identified, IV checked, risks and benefits discussed, monitors and equipment checked, pre-op evaluation, timeout performed, anesthesia consent and sterile technique used      Procedure Details    Patient Position:  Sitting  Prep: ChloraPrep and patient draped    Monitoring:  Cardiac monitor, continuous pulse ox and heart rate  Approach:  Midline  Location:  L3-4  Injection Technique:  Single-shot    Needle    Needle Type:  Sprotte  Needle Gauge:  22 G  Needle Length:  3.5 in    Assessment    Sensory Level:  T8  Events: clear CSF, CSF aspirated, well tolerated, difficulty and blood negative      Additional Comments

## 2023-12-22 ENCOUNTER — TELEPHONE (OUTPATIENT)
Dept: ORTHOPEDICS CLINIC | Facility: CLINIC | Age: 68
End: 2023-12-22

## 2023-12-22 VITALS
OXYGEN SATURATION: 90 % | SYSTOLIC BLOOD PRESSURE: 106 MMHG | HEIGHT: 68 IN | HEART RATE: 79 BPM | WEIGHT: 174 LBS | TEMPERATURE: 98 F | DIASTOLIC BLOOD PRESSURE: 64 MMHG | RESPIRATION RATE: 16 BRPM | BODY MASS INDEX: 26.37 KG/M2

## 2023-12-22 LAB
ANION GAP SERPL CALC-SCNC: 6 MMOL/L (ref 0–18)
BUN BLD-MCNC: 18 MG/DL (ref 9–23)
BUN/CREAT SERPL: 15.5 (ref 10–20)
CALCIUM BLD-MCNC: 8.9 MG/DL (ref 8.7–10.4)
CHLORIDE SERPL-SCNC: 101 MMOL/L (ref 98–112)
CO2 SERPL-SCNC: 27 MMOL/L (ref 21–32)
CREAT BLD-MCNC: 1.16 MG/DL
EGFRCR SERPLBLD CKD-EPI 2021: 69 ML/MIN/1.73M2 (ref 60–?)
GLUCOSE BLD-MCNC: 106 MG/DL (ref 70–99)
HCT VFR BLD AUTO: 39.9 %
HGB BLD-MCNC: 13.7 G/DL
OSMOLALITY SERPL CALC.SUM OF ELEC: 280 MOSM/KG (ref 275–295)
POTASSIUM SERPL-SCNC: 4.4 MMOL/L (ref 3.5–5.1)
SODIUM SERPL-SCNC: 134 MMOL/L (ref 136–145)

## 2023-12-22 PROCEDURE — 99214 OFFICE O/P EST MOD 30 MIN: CPT | Performed by: INTERNAL MEDICINE

## 2023-12-22 RX ORDER — HYDROCODONE BITARTRATE AND ACETAMINOPHEN 5; 325 MG/1; MG/1
1 TABLET ORAL EVERY 6 HOURS PRN
Status: DISCONTINUED | OUTPATIENT
Start: 2023-12-22 | End: 2023-12-22

## 2023-12-22 RX ORDER — HYDROCODONE BITARTRATE AND ACETAMINOPHEN 5; 325 MG/1; MG/1
1 TABLET ORAL EVERY 8 HOURS PRN
Qty: 25 TABLET | Refills: 0 | Status: SHIPPED | OUTPATIENT
Start: 2023-12-22

## 2023-12-22 NOTE — CHRONIC PAIN
[facility]   Acute Pain Rounds Note  2023    Patient name: Julius Cox 76year old male  : 1955  MRN: [id number]    Diagnosis: [unfilled]    S/P: s/p TKA    Pain modality: Duramorph    Current hospital day: Hospital Day: 2    Pain Scores: 3    Current Medications:  Scheduled Meds:   multivitamin  1 tablet Oral Daily    pantoprazole  40 mg Oral QAM AC    tamsulosin  0.8 mg Oral Nightly    sennosides  17.2 mg Oral Nightly    docusate sodium  100 mg Oral BID    aspirin  325 mg Oral BID     Continuous Infusions:   sodium chloride       PRN Meds:.sodium chloride, polyethylene glycol (PEG 3350), magnesium hydroxide, bisacodyl, fleet enema, ondansetron, metoclopramide, diphenhydrAMINE **OR** diphenhydrAMINE, acetaminophen, naloxone, acetaminophen, HYDROcodone-acetaminophen, HYDROcodone-acetaminophen, HYDROmorphone, HYDROmorphone, diphenhydrAMINE **OR** diphenhydrAMINE, nalbuphine    Assessment:  TKA    Plan:  Po meds    Prem Cason MD  Anesthesia Acute Pain Service [phone]

## 2023-12-22 NOTE — OPERATIVE REPORT
[facility]    PATIENT'S NAME: Rusty Scott   ATTENDING PHYSICIAN: [address] Berenice Machuca MD   OPERATING PHYSICIAN: Yadiel Machuca MD   PATIENT ACCOUNT#:   [id number]    LOCATION:  1E Room 3 A [facility]  MEDICAL RECORD #:   [id number]       YOB: 1955  ADMISSION DATE:       12/21/2023      OPERATION DATE:  12/21/2023    OPERATIVE REPORT      PREOPERATIVE DIAGNOSIS:  Primary osteoarthritis, right knee. POSTOPERATIVE DIAGNOSIS:  Primary osteoarthritis, right knee. PROCEDURE:  Right total knee arthroplasty with MRI templated patient-specific instruments. ASSISTANT:  First assistant will be Bimal Workman PA-C. Second assistant, PERI Manrique. ANESTHESIA:  Dr. Mira Browne, with spinal using Duramorph/MAC. INDICATIONS:  Patient is a 58-year-old independent male with the above diagnosis documented by physical exam, x-ray, and MRI. Both knees have end-stage osteoarthritis, and he wanted to start with the right knee. The risks and complications of surgery were discussed. No guarantees were given. The consent was signed. OPERATIVE TECHNIQUE:  Patient was brought to the operating, placed in supine position. Appropriate IV access and monitors were placed. Ancef 2 g IV and tranexamic acid 1 g IV were both given as prophylaxis at the appropriate interval.  Spinal with Duramorph followed by monitored sedation was performed by Dr. Mira Browne. A tourniquet was placed on the upper right thigh, followed by SCD boot on the left leg. The right lower extremity was then prepped and draped in sterile fashion with Betadine, followed by ChloraPrep. The drapes were placed sterilely. The leg was exsanguinated and tourniquet inflated to 300 mmHg. Tourniquet time for the case was 81 minutes. Incision was made and a midvastus snip was used to gain access to the right knee. Tricompartmental osteoarthritis was confirmed. Much of the fat pad was removed.   The patella was everted and resected from 25 mm to 14 mm.  A 41 mm patella fit optimally and the 3 drill holes placed. A protector was placed, and the patella tucked in the lateral gutter. The knee was flexed and retractors put in position. The cruciate ligaments and anterior menisci were excised. The patient specific guide was applied to the distal femur and fit well. The pins were placed, and the distal femoral cuts were made as templated. The size 9 cutting block was applied and seen to be properly externally rotated. Anterior cut was made without notch. Posterior cuts were made as templated. The chamfer cuts were finished. Some medial and small lateral osteophytes were removed. The rest of the menisci were excised. The patient specific guide was applied to the proximal tibia. The drop dejah was in line with the anterior tibial spine with proper posterior slope. Pins were placed. The medial and lateral cuts were made as templated. Some medial osteophyte was removed. Despite this, we upsized to an H tibia. This fit more optimally than the G or even the F that was templated. Spacer blocks showed good balance with a 10 mm spacer in both flexion and extension, and there was good collateral ligament balance as well. The drop dejah was in line with the anterior tibial spine, and we elected to proceed to cutting the femoral notch and trial.  With a 10 and 11 mm spacer, the knee had full extension and full deep flexion without liftoff. There was good balance of the collateral ligaments at 0 degrees, 40 degrees, and 100 degrees. There was good rollback and patellar tracking. The pegs were drilled for the femur and the stem prepared for the tibia. Some additional drill holes were placed in the medial side. Two batches of Emile methylmethacrylate cement with gentamicin premixed was prepared in a vacuum container. The bone surfaces were cleansed thoroughly with saline using pulse lavage then dried thoroughly.   The cement was pressurized into the bone surfaces and placed on the back of the components. The tibia followed by femur followed by patella were cemented in standard fashion. A size 11 mm spacer was placed and an assistant held axial pressure while the excess cement was removed and cement hardened. During the last 4 minutes of cement drying, a dilute Betadine solution was placed in the knee. Once the cement had hardened, the Betadine was flushed using Pulsavac and saline. Excess cement was removed from the knee including the posterior aspect and notch. Trial reduction confirmed an 11 mm spacer fit optimally, better than the 10 mm or 12 mm. The tourniquet was let down. The cruciate ligament branch off the popliteal artery had some bleeding and this was controlled with Adson point hemostat and bipolar electrocautery. No significant bleeding was seen, and the polyethylene was snapped into position with no soft tissue interposed. The knee again had excellent balance, motion, tracking, and rollback. The knee was closed in flexion in layers and again no significant bleeding was noted throughout this portion. The wound was irrigated with saline once again, and the capsule closed in flexion. The knee was injected with 80 mL of joint cocktail for postop pain relief. The skin was closed in layers with staples for the outer layer. Silver Mepilex dressing was applied followed by cotton, Ace wrap, towels, and ice pack. The SCD was placed on the right leg. The patient had a palpable dorsalis pedis pulse confirmed by Doppler ultrasound at the end of the case. His foot was warm, and the skin appeared symmetrically normal to the left foot. Tranexamic acid 1 g IV second dose was given in the OR at the end of the case. Blood loss was 75 mL. The specimens were the bone cuts to Pathology. There were no drains and no complications, and the patient tolerated the procedure well. Dictated By Thao Rodrigues.  Nadia Tay MD  d: 12/21/2023 16:14:28  t: 12/21/2023 20:26:39  Job [id number]  [facility]/    cc:  Brook Dewey MD

## 2023-12-22 NOTE — CM/SW NOTE
SW followed up on DC planning. SW spoke with pt regarding [place] [facility] [address] services. Pt is aware and agreeable to [place] [facility] [address]. Has no preference on accepting agency    [facility] accepted which is MD's preferred and pt is agreeable    Notified [facility] they are choice    PLAN: DC home with [address], LSW, MSW ext.  [phone]

## 2023-12-22 NOTE — PHYSICAL THERAPY NOTE
PHYSICAL THERAPY TREATMENT NOTE - INPATIENT     Room Number: Room 3/Room 3-A       Presenting Problem: right TKA 12/21/23    Problem List  Principal Problem:    Primary osteoarthritis of right knee  Active Problems:    BPH with obstruction/lower urinary tract symptoms      PHYSICAL THERAPY ASSESSMENT   Chart reviewed. RN Lm Kwok approved participation in physical therapy. PPE worn by therapist: mask and gloves. Patient was not wearing a mask during session. Patient presented in bed with did not rate pain. Patient with good  progress towards goals during this session. Education provided on Total knee exercise protocol, Physical therapy plan of care, and physiological benefits of out of bed mobility. Patient with good carryover. Pt is received in the bed and was cleared for therapy session. Pt is SBA with bed mobility and to transfer to the EOB. Pt sat EOB for a few minutes and denied any dizziness and light headedness. BP taken was 104/56. Pt's spouse was also present during session. Pt is SBA with sit<>stand transfers and with AMB in the hallways. Pt AMB about 100' with the RW SBA. Pt with decreased ric and step length but with very good balance and safety awareness. Pt with very good mobility this session. Pt did report increased pain this afternoon. Returned pt back to the room and to sitting EOB with OT. Pt is handed off to OT in the room. Pt is on track to dc to home once medically cleared. Reported to the RN on the status of the pt. Bed mobility: Contact guard assist  Transfers: Supervision  Gait Assistance: Supervision  Distance (ft): 100'  Assistive Device: Rolling walker  Pattern: R Decreased stance time          . Patient was left in  sitting EOB with OT  at end of session with all needs in reach. The patient's Approx Degree of Impairment: 46.58% has been calculated based on documentation in the [facility] '6 clicks' Inpatient Basic Mobility Short Form.   Research supports that patients with this level of impairment may benefit from Home with home health PT. RN aware of patient status post session. DISCHARGE RECOMMENDATIONS  PT Discharge Recommendations: Home with home health PT;24 hour care/supervision     PLAN  PT Treatment Plan: Bed mobility; Body mechanics; Coordination; Endurance; Patient education;Gait training;Range of motion;Strengthening;Stoop training;Stair training;Transfer training;Balance training    SUBJECTIVE  Pt was agreeable to therapy session. OBJECTIVE       WEIGHT BEARING RESTRICTION  Weight Bearing Restriction: R lower extremity        R Lower Extremity: Weight Bearing as Tolerated       PAIN ASSESSMENT   Rating:  (did not rate)  Location: R Knee  Management Techniques: Activity promotion; Body mechanics; Relaxation;Repositioning    BALANCE                                                                                                                       Static Sitting: Good  Dynamic Sitting: Fair +           Static Standing: Fair  Dynamic Standing: Fair -    ACTIVITY TOLERANCE           BP: 104/56  BP Location: Left arm  BP Method: Automatic  Patient Position: Sitting    O2 WALK       AM-PAC '6-Clicks' INPATIENT SHORT FORM - BASIC MOBILITY  How much difficulty does the patient currently have. .. Patient Difficulty: Turning over in bed (including adjusting bedclothes, sheets and blankets)?: A Little   Patient Difficulty: Sitting down on and standing up from a chair with arms (e.g., wheelchair, bedside commode, etc.): A Little   Patient Difficulty: Moving from lying on back to sitting on the side of the bed?: A Little   How much help from another person does the patient currently need. ..    Help from Another: Moving to and from a bed to a chair (including a wheelchair)?: A Little   Help from Another: Need to walk in hospital room?: A Little   Help from Another: Climbing 3-5 steps with a railing?: A Little     AM-PAC Score:  Raw Score: 18   Approx Degree of Impairment: 46.58%   Standardized Score (AM-PAC Scale): 43.63   CMS Modifier (G-Code): CK          Patient End of Session: In bed;Needs met;Call light within reach; With [facility] staff;RN aware of session/findings; All patient questions and concerns addressed; Family present    CURRENT GOALS   Goals to be met by: 12/22/23  Patient Goal Patient's self-stated goal is: to go home with home PT   Goal #1 Patient is able to demonstrate supine - sit EOB @ level: modified independent     Goal #1   Current Status CGA    Goal #2 Patient is able to demonstrate transfers Sit to/from Stand at assistance level: supervision     Goal #2  Current Status SBA with the RW   Goal #3 Patient is able to ambulate 150 feet with assistive device at assistance level: supervision   Goal #3   Current Status 100' with the RW SBA   Goal #4 Patient will negotiate 4 stairs/one curb w/ assistive device and supervision   Goal #4   Current Status 8 stairs with 1 HR CGA am session   Goal #5  AROM 0 degrees extension to 95 degrees flexion     Goal #5   Current Status IN PROGRESS   Goal #6 Patient independently performs home exercise program for ROM/strengthening per the instructions provided in preparation for discharge.    Goal #6  Current Status IN PROGRESS             Therapeutic Activity: 23 minutes

## 2023-12-22 NOTE — PHYSICAL THERAPY NOTE
PHYSICAL THERAPY TREATMENT NOTE - INPATIENT     Room Number: Room 3/Room 3-A       Presenting Problem: right TKA 12/21/23    Problem List  Principal Problem:    Primary osteoarthritis of right knee  Active Problems:    BPH with obstruction/lower urinary tract symptoms      PHYSICAL THERAPY ASSESSMENT   Chart reviewed. RN Linda Cruz approved participation in physical therapy. PPE worn by therapist: mask and gloves. Patient was not wearing a mask during session. Patient presented in bed with did not rate pain. Patient with good  progress towards goals during this session. Education provided on Total knee exercise protocol, Physical therapy plan of care, physiological benefits of out of bed mobility, and Stair negotiation . Patient with good carryover. Pt is received in the bed and was cleared for therapy session. Pt is SBA with bed mobility and to transfer to the EOB. Pt sat EOB for a few minutes and denied any dizziness and light headedness. Pt is SBA with sit<>stand transfers and with AMB in the hallways. Pt AMB about 100' with the RW SBA. Pt with decreased ric and step length but with very good balance and safety awareness. Pt AMB to the stairs for stair training. Pt was educated and able to negotiate 8 stairs with 1 HR CGA. Pt is cued for proper technique and sequencing. Pt with very good balance on the stairs. As pt was AMB back to his room he reported increased dizziness and sat in wc that was in hallway. Pt was brought back in the wc. RN was present to assist. Pt's BP taken was 67/47 sitting in the wc. RN assisted with transferring pt back to the bed> pt is able to reposition himself in the bed. Pt is left in the bed with all needs within reach. Pt's BP in supine was 98/53. Handed pt off to the RN in the room. Will follow up with pt this afternoon for BID session if appropriate.      Bed mobility: Supervision  Transfers: Supervision  Gait Assistance: Supervision  Distance (ft): 100'  Assistive Device: Rolling walker  Pattern: R Decreased stance time          . Patient was left in bed at end of session with all needs in reach. The patient's Approx Degree of Impairment: 46.58% has been calculated based on documentation in the [facility] '6 clicks' Inpatient Basic Mobility Short Form. Research supports that patients with this level of impairment may benefit from Home with home health PT. RN aware of patient status post session. DISCHARGE RECOMMENDATIONS  PT Discharge Recommendations: Home with home health PT;24 hour care/supervision     PLAN  PT Treatment Plan: Bed mobility; Body mechanics; Coordination; Endurance; Patient education;Gait training;Range of motion;Strengthening;Stoop training;Stair training;Transfer training;Balance training    SUBJECTIVE  Pt was agreeable to therapy session. OBJECTIVE       WEIGHT BEARING RESTRICTION  Weight Bearing Restriction: R lower extremity        R Lower Extremity: Weight Bearing as Tolerated       PAIN ASSESSMENT   Rating:  (did not rate)  Location: R knee  Management Techniques: Activity promotion; Body mechanics; Relaxation;Repositioning    BALANCE                                                                                                                       Static Sitting: Good  Dynamic Sitting: Fair +           Static Standing: Fair  Dynamic Standing: Fair -    ACTIVITY TOLERANCE           BP: (!) 67/47  BP Location: Right arm  BP Method: Automatic  Patient Position: Sitting    O2 WALK       AM-PAC '6-Clicks' INPATIENT SHORT FORM - BASIC MOBILITY  How much difficulty does the patient currently have. ..   Patient Difficulty: Turning over in bed (including adjusting bedclothes, sheets and blankets)?: A Little   Patient Difficulty: Sitting down on and standing up from a chair with arms (e.g., wheelchair, bedside commode, etc.): A Little   Patient Difficulty: Moving from lying on back to sitting on the side of the bed?: A Little   How much help from another person does the patient currently need. .. Help from Another: Moving to and from a bed to a chair (including a wheelchair)?: A Little   Help from Another: Need to walk in hospital room?: A Little   Help from Another: Climbing 3-5 steps with a railing?: A Little     AM-PAC Score:  Raw Score: 18   Approx Degree of Impairment: 46.58%   Standardized Score (AM-PAC Scale): 43.63   CMS Modifier (G-Code): CK        Patient End of Session: Needs met;Call light within reach;RN aware of session/findings; All patient questions and concerns addressed; In bed    CURRENT GOALS   Goals to be met by: 12/22/23  Patient Goal Patient's self-stated goal is: to go home with home PT   Goal #1 Patient is able to demonstrate supine - sit EOB @ level: modified independent     Goal #1   Current Status SBA   Goal #2 Patient is able to demonstrate transfers Sit to/from Stand at assistance level: supervision     Goal #2  Current Status SBA with the RW   Goal #3 Patient is able to ambulate 150 feet with assistive device at assistance level: supervision   Goal #3   Current Status 100' with the RW SBA   Goal #4 Patient will negotiate 4 stairs/one curb w/ assistive device and supervision   Goal #4   Current Status 8 stairs with 1 HR CGA   Goal #5  AROM 0 degrees extension to 95 degrees flexion     Goal #5   Current Status IN PROGRESS   Goal #6 Patient independently performs home exercise program for ROM/strengthening per the instructions provided in preparation for discharge.    Goal #6  Current Status IN PROGRESS             Therapeutic Activity: 30 minutes

## 2023-12-22 NOTE — DISCHARGE SUMMARY
[facility]OSP [facility]    Discharge Summary    Lani Dial Patient Status:  Outpatient in a Bed    1955 MRN [id number]   Location [facility] Attending Watson Cruz MD   Hosp Day # 0 PCP Annelise Vega MD     Date of Admission: 2023 Disposition: Home or Self Care     Date of Discharge: 2023    Admitting Diagnosis: Primary osteoarthritis of right knee [M17.11]    Hospital Discharge Diagnoses:  OA, BPH    Lace+ Score: 25  59-90 High Risk  29-58 Medium Risk  0-28   Low Risk. TCM Follow-Up Recommendation:  LACE < 29: Low Risk of readmission after discharge from the hospital. No TCM follow-up needed.       Problem List:   Patient Active Problem List   Diagnosis    Chronic pain of right knee    Hypercholesteremia    Vitamin D deficiency    Hypocalcemia    BPH with obstruction/lower urinary tract symptoms    Thrombocytopenia (HCC)    Erectile dysfunction    Gastroesophageal reflux disease without esophagitis    Dermatitis    Internal hemorrhoids    Polyp of colon    Primary osteoarthritis of right knee       Reason for Admission: elective surgery    Physical Exam:   General appearance: alert, appears stated age and cooperative  Pulmonary:  clear to auscultation bilaterally  Cardiovascular: S1, S2 normal, no murmur, click, rub or gallop, regular rate and rhythm  Abdominal: soft, non-tender; bowel sounds normal; no masses,  no organomegaly  Extremities: extremities normal, atraumatic, no cyanosis or edema  Psychiatric: calm      History of Present Illness: OA knee, failed conservative tx and elected TKA    Hospital Course: uncomplicated    Consultations: hospitalist    Procedures: TKA    Complications: none    Discharge Condition: Good    Discharge Medications:      Discharge Medications        START taking these medications        Instructions Prescription details   acetaminophen 325 MG Tabs  Commonly known as: Tylenol      Take 2 tablets (650 mg total) by mouth every 8 (eight) hours as needed. Maximum 4000 mg Tylenol/acetaminophen daily from all sources. Recall that each Norco has 325 mg of Tylenol/acetaminophen in it. Quantity: 60 tablet  Refills: 0     aspirin 325 MG Tbec      Take 1 tablet (325 mg total) by mouth in the morning and 1 tablet (325 mg total) before bedtime. Do not crush. Quantity: 60 tablet  Refills: 0     Calcium 600-5 MG-MCG Tabs      Take 1 tablet by mouth 2 (two) times daily with meals. Quantity: 60 tablet  Refills: 0     docusate sodium 100 MG Caps  Commonly known as: COLACE      Take 100 mg by mouth 2 (two) times daily. Do not crush. Stop if loose stool. Quantity: 20 capsule  Refills: 0     HYDROcodone-acetaminophen 5-325 MG Tabs  Commonly known as: Norco      Take 1 tablet by mouth every 8 (eight) hours as needed. No alcohol or driving on this med. Stop if lethargic or hallucinating. Quantity: 25 tablet  Refills: 0            CHANGE how you take these medications        Instructions Prescription details   tamsulosin 0.4 MG Caps  Commonly known as: Flomax  What changed: when to take this      TAKE 2 CAPSULES BY MOUTH DAILY   Quantity: 180 capsule  Refills: 3            CONTINUE taking these medications        Instructions Prescription details   B COMPLEX OR      Take by mouth. Refills: 0     ergocalciferol 1.25 MG (43700 UT) Caps  Commonly known as: Vitamin D2      Take by mouth every 7 days. Refills: 0     GLUCOSAMINE CHOND CMP ADVANCED OR      Take by mouth. Refills: 0     Multivitamin Tabs      Take 1 tablet by mouth daily. Quantity: 90 tablet  Refills: 3     pantoprazole 40 MG Tbec  Commonly known as: Protonix      Take 1 tablet (40 mg total) by mouth every morning before breakfast.   Quantity: 90 tablet  Refills: 3     Tadalafil 20 MG Tabs  Commonly known as: Cialis      Take 1 tablet (20 mg total) by mouth as needed for Erectile Dysfunction. Quantity: 24 tablet  Refills: 3     Vitamin B 12 250 MCG Lozg      Take by mouth. Refills: 0               Where to Get Your Medications        These medications were sent to [facility]/pharmacy #[id number]- [address], [address] AT [place], [phone], [facility], [address] [facility] [address] [address]      Phone: [phone]   HYDROcodone-acetaminophen 5-325 MG Tabs       These medications were sent to [address], [address] [place] [address], [phone], [phone]  [place] [id number]      Phone: [phone]   acetaminophen 325 MG Tabs  aspirin 325 MG Tbec  Calcium 600-5 MG-MCG Tabs  docusate sodium 100 MG Caps         Follow up Visits: Follow-up with  in 1 week    Follow up Labs:       Other Discharge Instructions:     Vasquez Solis MD  12/22/2023  10:43 AM    > 35 min

## 2023-12-23 ENCOUNTER — TELEPHONE (OUTPATIENT)
Dept: ORTHOPEDICS CLINIC | Facility: CLINIC | Age: 68
End: 2023-12-23

## 2023-12-23 NOTE — TELEPHONE ENCOUNTER
Called patient message left via . Will try to reach out to him later. Office phone number given if he needs to call/  post op nurse

## 2023-12-26 ENCOUNTER — TELEPHONE (OUTPATIENT)
Dept: ORTHOPEDICS CLINIC | Facility: CLINIC | Age: 68
End: 2023-12-26

## 2023-12-26 NOTE — TELEPHONE ENCOUNTER
Called patient for 3rd time and  left a message with office phone number if he needs anything /post op nurse

## 2023-12-26 NOTE — TELEPHONE ENCOUNTER
Patient returning call, has no concerns. Patient confirmed his post op appointment on 1/9/2024,and also indicates he is awaiting for at home P.T. today eta 1:00 pm - 2:00 pm, thanks.

## 2024-01-09 ENCOUNTER — HOSPITAL ENCOUNTER (OUTPATIENT)
Dept: GENERAL RADIOLOGY | Facility: HOSPITAL | Age: 69
Discharge: HOME | End: 2024-01-09
Attending: ORTHOPAEDIC SURGERY
Payer: MEDICARE

## 2024-01-09 ENCOUNTER — OFFICE VISIT (OUTPATIENT)
Dept: ORTHOPEDICS CLINIC | Facility: CLINIC | Age: 69
End: 2024-01-09

## 2024-01-09 DIAGNOSIS — Z96.651 S/P TKR (TOTAL KNEE REPLACEMENT) USING CEMENT, RIGHT: Primary | ICD-10-CM

## 2024-01-09 DIAGNOSIS — M17.11 PRIMARY OSTEOARTHRITIS OF RIGHT KNEE: ICD-10-CM

## 2024-01-09 DIAGNOSIS — Z47.89 ORTHOPEDIC AFTERCARE: ICD-10-CM

## 2024-01-09 PROCEDURE — 1111F DSCHRG MED/CURRENT MED MERGE: CPT | Performed by: ORTHOPAEDIC SURGERY

## 2024-01-09 PROCEDURE — 73562 X-RAY EXAM OF KNEE 3: CPT | Performed by: ORTHOPAEDIC SURGERY

## 2024-01-09 PROCEDURE — 99024 POSTOP FOLLOW-UP VISIT: CPT | Performed by: ORTHOPAEDIC SURGERY

## 2024-01-09 RX ORDER — HYDROCODONE BITARTRATE AND ACETAMINOPHEN 5; 325 MG/1; MG/1
1 TABLET ORAL EVERY 6 HOURS PRN
Qty: 25 TABLET | Refills: 0 | Status: SHIPPED | OUTPATIENT
Start: 2024-01-09

## 2024-01-09 NOTE — PROGRESS NOTES
NURSING INTAKE COMMENTS:   Chief Complaint   Patient presents with    Post-Op     1st POV R TKA - Pt rates pain 3/10, took pain medication - still some swelling - no numbness or tingling - no fever or chills -  hard time sleeping due to discomfort        HPI: This 68 year old male presents today 2.5 weeks status post right knee replacement.  Patient is ambulating with a walker today.  Patient states he still having pain in his right knee.  His biggest complaint lack of sleep due to right knee pain.  For pain he is taking Norco and Tylenol as needed, but stating he is trying to only take medication every 12 hours.  He denies any numbness, tingling, or calf pain.  Reports that he is taking aspirin once daily, but not high with daily.  He has finished his inpatient home rehab, but not started formal outpatient physical therapy.    Past Medical History:   Diagnosis Date    Anxiety state     Colon polyps 2023    repeat CLN in 2026    High cholesterol     Hypercholesteremia 01/07/2022    Osteoarthritis     PONV (postoperative nausea and vomiting)     Prediabetes      Past Surgical History:   Procedure Laterality Date    APPENDECTOMY  2007    COLONOSCOPY N/A 5/9/2023    Procedure: COLONOSCOPY;  Surgeon: LONG Yu MD;  Location: [facility] ENDOSCOPY    HERNIA SURGERY  1997    OTHER SURGICAL HISTORY  2002    meniscal tear    OTHER SURGICAL HISTORY  2002    ulcer     Current Outpatient Medications   Medication Sig Dispense Refill    HYDROcodone-acetaminophen 5-325 MG Oral Tab Take 1 tablet by mouth every 8 (eight) hours as needed. No alcohol or driving on this med. Stop if lethargic or hallucinating. 25 tablet 0    Misc Natural Products (GLUCOSAMINE CHOND CMP ADVANCED OR) Take by mouth.      acetaminophen 325 MG Oral Tab Take 2 tablets (650 mg total) by mouth every 8 (eight) hours as needed. Maximum 4000 mg Tylenol/acetaminophen daily from all sources.  Recall that each Norco has 325 mg of Tylenol/acetaminophen in it. 60  tablet 0    aspirin 325 MG Oral Tab EC Take 1 tablet (325 mg total) by mouth in the morning and 1 tablet (325 mg total) before bedtime. Do not crush. 60 tablet 0    docusate sodium 100 MG Oral Cap Take 100 mg by mouth 2 (two) times daily. Do not crush.  Stop if loose stool. 20 capsule 0    Calcium 600-5 MG-MCG Oral Tab Take 1 tablet by mouth 2 (two) times daily with meals. 60 tablet 0    Multiple Vitamin (MULTIVITAMIN) Oral Tab Take 1 tablet by mouth daily. 90 tablet 3    TAMSULOSIN 0.4 MG Oral Cap TAKE 2 CAPSULES BY MOUTH DAILY (Patient taking differently: Take 2 capsules (0.8 mg total) by mouth at bedtime.) 180 capsule 3    B Complex Vitamins (B COMPLEX OR) Take by mouth.      pantoprazole 40 MG Oral Tab EC Take 1 tablet (40 mg total) by mouth every morning before breakfast. 90 tablet 3    Cyanocobalamin (VITAMIN B 12) 250 MCG Oral Lozenge Take by mouth.      Tadalafil (CIALIS) 20 MG Oral Tab Take 1 tablet (20 mg total) by mouth as needed for Erectile Dysfunction. 24 tablet 3    ergocalciferol 1.25 MG (76679 UT) Oral Cap Take by mouth every 7 days.       No Known Allergies  History reviewed. No pertinent family history.  No family Hx of DVT/PE    Social History     Occupational History    Not on file   Tobacco Use    Smoking status: Former     Types: Cigarettes     Quit date: 2003     Years since quittin.0    Smokeless tobacco: Never   Vaping Use    Vaping Use: Never used   Substance and Sexual Activity    Alcohol use: Yes     Comment: socially    Drug use: Never    Sexual activity: Not on file        Review of Systems:  GENERAL: feels generally well, no significant weight loss or weight gain  SKIN: no ulcerated or worrisome skin lesions  EYES:denies blurred vision or double vision  HEENT: denies new nasal congestion, sinus pain or ST  LUNGS: denies shortness of breath  CARDIOVASCULAR: denies chest pain  GI: no hematemesis, no worsening heartburn, no diarrhea  : no dysuria, no blood in urine, no  difficulty urinating, no incontinence  MUSCULOSKELETAL: no other musculoskeletal complaints other than in HPI  NEURO: no numbness or tingling, no weakness or balance disorder  PSYCHE: no depression or anxiety  HEMATOLOGIC: no hx of blood dyscrasia, no Hx DVT/PE  ENDOCRINE: no thyroid or diabetes issues  ALL/ASTHMA: no new hx of severe allergy or asthma    Physical Examination:    There were no vitals taken for this visit.  Constitutional: appears well hydrated, alert and responsive, no acute distress noted  Extremities: Lower extremity skin healthy, no pitting edema.  Calves soft and nontender.  Musculoskeletal: Incision healing well, staples taken out today.  There is an abrasion medial to the incision, patient states that he had a blister and it popped.  Right knee has slight asymmetric warmth and swelling, consistent with postop.  Range of motion 0 to 110 degrees, no instability.  Neurological: Normal motor and sensory function    Imaging: X-rays taken in office today show right knee implant is well-fixed and in proper alignment.      XR KNEE (1 OR 2 VIEWS), RIGHT (CPT=73560)    Result Date: 12/21/2023  PROCEDURE: XR KNEE (1 OR 2 VIEWS), RIGHT (CPT=73560)  COMPARISON: [facility] 2nd Floor, XR KNEE (3 VIEWS), RIGHT (CPT=73562), 1/03/2023, 3:47 PM.  [facility], XR KNEE (3 VIEWS), RIGHT (CPT=73562), 1/07/2022, 3:46 PM.  INDICATIONS: Status post right total knee arthroplasty today.  TECHNIQUE: 2 views were obtained.   FINDINGS:  There are postsurgical changes of right total knee arthroplasty.  The hardware appears well seated, well aligned, and intact.  No acute fracture is seen.  There are postsurgical soft tissue changes.  There is a wound VAC.         CONCLUSION: Status post right total knee arthroplasty without evidence of hardware complication.    Dictated by (CST): Javan Tobias MD on 12/21/2023 at 12:14 PM     Finalized by (CST): Javan Tobias MD on 12/21/2023 at 12:18  PM             Lab Results   Component Value Date    WBC 5.1 11/21/2023    HGB 13.7 12/22/2023    .0 11/21/2023      Lab Results   Component Value Date     (H) 12/22/2023    BUN 18 12/22/2023    CREATSERUM 1.16 12/22/2023        Assessment and Plan:  Diagnoses and all orders for this visit:    S/P TKR (total knee replacement) using cement, right  -     Physical Therapy Referral - [facility]    Orthopedic aftercare  -     XR KNEE (3 VIEWS), RIGHT (CPT=73562); Future  -     Physical Therapy Referral - [facility]    Primary osteoarthritis of right knee  -     Physical Therapy Referral - [facility]        Assessment: As above    Plan: The patient is doing well 2.5 weeks status post right knee replacement.  For his pain, I gave him a refill of Norco fives.  Discussed that he can alternate between Norco and Tylenol as needed.  Also discussed that for pain at night, he may try taking his Norco before bed.  Discussed with him the importance of taking aspirin 325 mg twice daily for anticoagulation.  Gave the patient a prescription for outpatient physical therapy, he will begin going twice a week for 4 weeks.  We will see him in 4 weeks for repeat x-rays and evaluation.    Follow Up: No follow-ups on file.    EDITA Lynne

## 2024-01-11 ENCOUNTER — TELEPHONE (OUTPATIENT)
Dept: FAMILY MEDICINE CLINIC | Facility: CLINIC | Age: 69
End: 2024-01-11

## 2024-01-11 DIAGNOSIS — Z00.00 ENCOUNTER FOR ANNUAL HEALTH EXAMINATION: Primary | ICD-10-CM

## 2024-01-11 NOTE — TELEPHONE ENCOUNTER
Dr. Stevens, patient is schedule for a Medicare Px on 02/13/24. Patient is requesting blood test order for appointment. Please advise.

## 2024-01-11 NOTE — TELEPHONE ENCOUNTER
Patient is requesting routine lab orders placed prior to appointment scheduled with PCP on 2/13/2024.   Please advise.

## 2024-01-15 NOTE — TELEPHONE ENCOUNTER
1. Encounter for annual health examination    - CBC With Differential With Platelet; Future  - Comp Metabolic Panel (14); Future  - Hemoglobin A1C; Future  - Lipid Panel; Future  - TSH W Reflex To Free T4; Future

## 2024-01-22 ENCOUNTER — OFFICE VISIT (OUTPATIENT)
Dept: PHYSICAL THERAPY | Facility: HOSPITAL | Age: 69
End: 2024-01-22
Payer: MEDICARE

## 2024-01-22 DIAGNOSIS — M17.11 PRIMARY OSTEOARTHRITIS OF RIGHT KNEE: ICD-10-CM

## 2024-01-22 DIAGNOSIS — Z96.651 S/P TKR (TOTAL KNEE REPLACEMENT) USING CEMENT, RIGHT: ICD-10-CM

## 2024-01-22 DIAGNOSIS — Z47.89 ORTHOPEDIC AFTERCARE: Primary | ICD-10-CM

## 2024-01-22 PROCEDURE — 97110 THERAPEUTIC EXERCISES: CPT

## 2024-01-22 PROCEDURE — 97162 PT EVAL MOD COMPLEX 30 MIN: CPT

## 2024-01-22 NOTE — PROGRESS NOTES
POST-OP KNEE EVALUATION:     Diagnosis:   Orthopedic aftercare (Z47.89)  Primary osteoarthritis of right knee (M17.11)  S/P TKR (total knee replacement) using cement, right (Z96.651)      Referring Provider: Ayden  Date of Evaluation:    1/22/2024    Precautions:  None Next MD visit:   Not Scheduled  Date of Surgery: 12/21/2024   : Pt declined     PATIENT SUMMARY   Ashutosh Pike is a 68 year old male who presents to therapy today s/p R TKA  on 12/21/2024 with complaints of knee pain and stiffness.    History of Current Condition: Had completed 7 visits of home health, states it was very helpful but is discharged now. Worked on stretching and exercises and walking. Is current using straight cane, was previously using rolling walker, uses cane for safety. At this time his biggest compliant is that he is not sleeping well because of the knee pain, is sleeping either on his back or on his R side. Also reporting pain with L knee as well, worsening with favoring RLE.   Denies N/T  Endorses edema at knee, states previously was at whole RLE. Now only at knee. Taking Asprin 2x/daily for anticoagulation.       Imaging (X-ray, 1/9/24): \"CONCLUSION:   1. Right knee total prosthesis in anatomic position.  No adverse features.   2. Moderate right knee joint effusion with suprapatellar soft tissue swelling as developed since 12/21/2023.   3. Moderate-severe left knee tricompartment osteoarthritis.\"      Pt describes pain level current 0/10, at best 0/10, at worst 4/10.   Quality, Location: throbbing, aching around anterior knee  Relieving: icing, pain medication at night before sleeping.   Aggravating End range motion, prolonged sitting/standing    Current functional limitations include sit to stand transfers, walking, stair negotiation, bending, donning/doffing shoes, and prolonged sitting/standing.   Home Set Up: 5 stairs into home, railing. Lives with wife, pt now independent with ADL, was previously requiring  assistance.   Occupation: Working full time, construction. 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   Physical Activity Level: active lifestyle, walking at work.     Ashutosh describes prior level of function fully independent, limited by B knee pain. Pt goals include decreasing pain levels and return to normal function.  Past medical history was reviewed with Ashutosh. Significant findings include  has a past medical history of Anxiety state, Colon polyps (2023), High cholesterol, Hypercholesteremia (01/07/2022), Osteoarthritis, PONV (postoperative nausea and vomiting), and Prediabetes.        ASSESSMENT  Ashutosh presents to physical therapy evaluation with primary c/o R knee pain s/p TKA 12/21/2023. The results of the objective tests and measures show decreased ROM, strength deficits, scar tissue formation, soft tissue restriction, and decreased stability based on TUG, 5xSTS and single leg stance values compared to age matched norms. Pt also reporting swelling in the knee that he states is much better compared to before, reviewed in sessions signs/symptoms of DVT and instructing patient to monitor, he is agreeable.  Functional deficits include but are not limited to sit to stand transfers, walking, stair negotiation, bending, donning/doffing shoes, and prolonged sitting/standing.  Signs and symptoms are consistent with diagnosis of referring diagnosis . Pt and PT discussed evaluation findings, pathology, POC and HEP.  Pt voiced understanding and performs HEP correctly without reported pain. Skilled Physical Therapy is medically necessary to address the above impairments and reach functional goals.     OBJECTIVE:   Observation/Skin: Pt presents with straight cane in LUE   Skin: scarring at incision, more at proximal incision. No abnormal trophic changes or erythema, warmth noted throughout R knee joint, no tenderness along distal leg.    WELLS Score for DVT      Enter  score  Calf swelling > 3 cm compared to other leg, measured 10 cm below tibial tuberosity              (1) 0   Collateral (nonvaricose) superficial veins present          (1) 0   Entire leg swollen                    (1) 0   Immobilization > 3 days or Surgery in past 4 weeks          (1) 1     Pitting edema, confined to symptomatic leg                      (1) 0   Paralysis, paresis, or recent plaster immobilization of the lower extremity                         (1) 0   Localized tenderness along the deep venous system                 (1) 0   Active/recent malignancy within 6 months                                      (1) 0   Past history of DVT                (1) 0   Alternative diagnosis to DVT is more likely     (subtract-2 points) -2   TOTAL -1     Palpation: Scar tissue noted along incision, most prominent at distal incision along tibia. Soft tissue restriction at distal quadriceps and hamstring  Edema: at knee jt line R: 42.5 cm, L 40 cm  Sensation: Diminished along lateral scar    AROM: (* denotes performed with pain)   Knee    Flexion: R 107; L 126   Extension: R -5; L 0     PROM: (* denotes performed with pain)   Knee    Flexion: R 110   Extension: R -4     Patellar Mobility/Accessory motion: hypomobility inferior/superior glide R patellofemoral glide, L WNL       Strength/MMT: (* denotes performed with pain)  Hip Knee   Flexion: R 4/5; L 5/5  Extension: R NT/5; L NT/5  Abduction: R 4-/5; L 4+/5  ER: R 4/5; L 5/5 Flexion: R 4/5; L 5/5  Extension: R 4/5; L 5/5        Balance: SLS: R 12 sec, L 7 sec    Functional Mobility:  5x sit<>stand: 14s (5/10 pain), no UE  TUG (AD, time): 9.9 sec,  straight cane no UE for STS    Gait: pt ambulates on level ground with assistive device of straight cane, antalgia, and decreased hip/knee flex or ext RLE .   Stair Negotiation: step to pattern with ascent/descent, lateral stepping with 2UE on R railing. Ascent with LLE, descent with RLE    Today’s Treatment and Response:    Pt education was provided on exam findings, treatment diagnosis, treatment plan, expectations, and prognosis. Pt was also provided recommendations for activity modifications, possible soreness after evaluation, modalities as needed [ice/heat], importance of remaining active, and elevating with ice for swelling management . Extensive discussion with patient regarding signs/symptoms of DVT, pt agreeable Patient was instructed in and issued a HEP for: continue current HH HEP, add quad set to towel      Charges: PT Eval Moderate Complexity, 1 TE       Total Timed Treatment: 10' min     Total Treatment Time: 45 min     Based on clinical rationale and outcome measures, this evaluation involved Moderate Complexity decision making due to 1-2 personal factors/comorbidities, 3 body structures involved/activity limitations, and evolving symptoms including changing pain levels.  PLAN OF CARE:    Goals: (To be met in 10 visits)   Pt will improve knee extension ROM to 0 deg to allow proper heel strike during gait and terminal knee extension in stance  Pt will improve knee AROM flexion to >120 degrees to improve ability to perform sit to stand transfer from low couch.   Pt will improve quad strength to 5/5 to ascend 1 flight of stairs reciprocally without UE assist  Pt will increase hip and knee strength to grossly 4+/5 to be able to get up and down from the floor safely  Pt will demonstrate increased hip ER/ABD strength to 5/5 to perform stepping and squatting activities without excessive femoral IR/ADD  Pt will improve SLS to >20s to improve safety and independence with gait on uneven surfaces such as grass  Pt will be independent and compliant with comprehensive HEP to maintain progress achieved in PT    Frequency / Duration: Patient will be seen for 1-2 x/week or a total of 10 visits over a 90 day period.  Treatment will include: Gait training, Manual Therapy, Neuromuscular Re-education, Self-Care Home Management,  Therapeutic Activities, Therapeutic Exercise, and Home Exercise Program instruction    Education or treatment limitation: None  Rehab Potential:excellent    LEFS Score  LEFS Score: 78.75 % (1/22/2024  1:59 PM)      Patient/Family/Caregiver was advised of these findings, precautions, and treatment options and has agreed to actively participate in planning and for this course of care.    Thank you for your referral. Please co-sign or sign and return this letter via fax as soon as possible to [phone]. If you have any questions, please contact me at Dept: [phone]    Sincerely,  Electronically signed by therapist: Gaby Nash PT  Physician's certification required: Yes  I certify the need for these services furnished under this plan of treatment and while under my care.    X___________________________________________________ Date____________________    Certification From: 1/22/2024  To:4/21/2024

## 2024-01-25 ENCOUNTER — OFFICE VISIT (OUTPATIENT)
Dept: PHYSICAL THERAPY | Facility: HOSPITAL | Age: 69
End: 2024-01-25
Payer: MEDICARE

## 2024-01-25 PROCEDURE — 97110 THERAPEUTIC EXERCISES: CPT

## 2024-01-25 PROCEDURE — 97140 MANUAL THERAPY 1/> REGIONS: CPT

## 2024-01-25 NOTE — PROGRESS NOTES
Diagnosis:   Orthopedic aftercare (Z47.89)  Primary osteoarthritis of right knee (M17.11)  S/P TKR (total knee replacement) using cement, right (Z96.651)      Referring Provider: Ayden  Date of Evaluation:    1/22/2024    Precautions:  None Next MD visit:   Not Scheduled  Date of Surgery: 12/21/2024   Insurance Primary/Secondary: MEDICARE / [employer] [place] INDEMNITY     # Auth Visits: no auth, 10 per POC            Subjective: \"I think the knee is getting better\". Did about an hour of walking yesterday and thinks maybe that was a little too much, but was better after icing and resting.   Pain: 0/10      Objective:  Palpation: Scar tissue noted along incision, most prominent at distal incision along tibia. Soft tissue restriction at distal quadriceps and hamstring  Edema: at knee jt line R: 42.5 cm, L 40 cm  Sensation: Diminished along lateral scar     AROM: (* denotes performed with pain)   Knee    Flexion: R 107; L 126   Extension: R -5; L 0      PROM: (* denotes performed with pain)   Knee    Flexion: R 110   Extension: R -4      Patellar Mobility/Accessory motion: hypomobility inferior/superior glide R patellofemoral glide, L WNL         Strength/MMT: (* denotes performed with pain)  Hip Knee   Flexion: R 4/5; L 5/5  Extension: R NT/5; L NT/5  Abduction: R 4-/5; L 4+/5  ER: R 4/5; L 5/5 Flexion: R 4/5; L 5/5  Extension: R 4/5; L 5/5         Balance: SLS: R 12 sec, L 7 sec     Functional Mobility:  5x sit<>stand: 14s (5/10 pain), no UE  TUG (AD, time): 9.9 sec,  straight cane no UE for STS       Gait: pt ambulates on level ground with assistive device of straight cane, antalgia, and decreased hip/knee flex or ext RLE .   Stair Negotiation: step to pattern with ascent/descent, lateral stepping with 2UE on R railing. Ascent with LLE, descent with RLE      Assessment: Focusing session on progressing knee extension passively and actively; given HEP stretching and strengthening to maintain therapy gains. Pt remains most  limited at superior patellofemoral glide, slightly improving within session.        Goals: (To be met in 10 visits)   Pt will improve knee extension ROM to 0 deg to allow proper heel strike during gait and terminal knee extension in stance  Pt will improve knee AROM flexion to >120 degrees to improve ability to perform sit to stand transfer from low couch.   Pt will improve quad strength to 5/5 to ascend 1 flight of stairs reciprocally without UE assist  Pt will increase hip and knee strength to grossly 4+/5 to be able to get up and down from the floor safely  Pt will demonstrate increased hip ER/ABD strength to 5/5 to perform stepping and squatting activities without excessive femoral IR/ADD  Pt will improve SLS to >20s to improve safety and independence with gait on uneven surfaces such as grass  Pt will be independent and compliant with comprehensive HEP to maintain progress achieved in PT    LEFS Score  LEFS Score: 78.75 % (1/22/2024  1:59 PM)      Plan: Patient will be seen for 1-2 x/week or a total of 10 visits over a 90 day period.  Treatment will include: Gait training, Manual Therapy, Neuromuscular Re-education, Self-Care Home Management, Therapeutic Activities, Therapeutic Exercise, and Home Exercise Program instruction  Date: 1/25/2024  TX#: 2/10 Date:                 TX#: 3/ Date:                 TX#: 4/ Date:                 TX#: 5/ Date:   Tx#: 6/   MT: 15'  Gr III superior, inferior PF glide  Knee extension OP        TE: 30'   Nustep, L4 UE/LE seat 11, 5'   Quad set to towel, x20, 5s hold  Straight Leg Raise with QS x20   SB hamstring curl, B x20  Bridging, x20  Standing heel/toe raise x20ea   Hamstring stretch on stair, R 5x15s                      HEP: Quad set, straight leg raise, bridging, hamstring stretch on stair, standing heel/toe raise    Charges: 1 Manual, 2 TE       Total Timed Treatment: 45' min  Total Treatment Time: 45'  min

## 2024-01-31 ENCOUNTER — OFFICE VISIT (OUTPATIENT)
Dept: PHYSICAL THERAPY | Facility: HOSPITAL | Age: 69
End: 2024-01-31
Payer: MEDICARE

## 2024-01-31 PROCEDURE — 97110 THERAPEUTIC EXERCISES: CPT

## 2024-01-31 PROCEDURE — 97140 MANUAL THERAPY 1/> REGIONS: CPT

## 2024-01-31 NOTE — PROGRESS NOTES
Diagnosis:   Orthopedic aftercare (Z47.89)  Primary osteoarthritis of right knee (M17.11)  S/P TKR (total knee replacement) using cement, right (Z96.651)      Referring Provider: Ayden  Date of Evaluation:    1/22/2024    Precautions:  None Next MD visit:   Not Scheduled  Date of Surgery: 12/21/2024   Insurance Primary/Secondary: MEDICARE / [employer] [place] INDEMN[place]     # Auth Visits: no auth, 10 per POC            Subjective: Patient reports he is feeling better and performing HEP at home.     Pain:   3-4/10 R knee      Objective:  1/31/2023:  R Knee ROM:  Prior to joint mobs: Flexion: 110 and Extension: -6  Post joint mobs: Flexion 115 and Extension: -4      At IE:  Palpation: Scar tissue noted along incision, most prominent at distal incision along tibia. Soft tissue restriction at distal quadriceps and hamstring  Edema: at knee jt line R: 42.5 cm, L 40 cm  Sensation: Diminished along lateral scar     AROM: (* denotes performed with pain)   Knee    Flexion: R 107; L 126   Extension: R -5; L 0      PROM: (* denotes performed with pain)   Knee    Flexion: R 110   Extension: R -4      Patellar Mobility/Accessory motion: hypomobility inferior/superior glide R patellofemoral glide, L WNL         Strength/MMT: (* denotes performed with pain)  Hip Knee   Flexion: R 4/5; L 5/5  Extension: R NT/5; L NT/5  Abduction: R 4-/5; L 4+/5  ER: R 4/5; L 5/5 Flexion: R 4/5; L 5/5  Extension: R 4/5; L 5/5         Balance: SLS: R 12 sec, L 7 sec     Functional Mobility:  5x sit<>stand: 14s (5/10 pain), no UE  TUG (AD, time): 9.9 sec,  straight cane no UE for STS       Gait: pt ambulates on level ground with assistive device of straight cane, antalgia, and decreased hip/knee flex or ext RLE .   Stair Negotiation: step to pattern with ascent/descent, lateral stepping with 2UE on R railing. Ascent with LLE, descent with RLE      Assessment: Patient demonstrates improvements in knee ROM inter-session and since initial evaluation. Continue to  progress ROM to improve gait mechanics. Patient demonstrates good quad activation during session.       Goals: (To be met in 10 visits)   Pt will improve knee extension ROM to 0 deg to allow proper heel strike during gait and terminal knee extension in stance  Pt will improve knee AROM flexion to >120 degrees to improve ability to perform sit to stand transfer from low couch.   Pt will improve quad strength to 5/5 to ascend 1 flight of stairs reciprocally without UE assist  Pt will increase hip and knee strength to grossly 4+/5 to be able to get up and down from the floor safely  Pt will demonstrate increased hip ER/ABD strength to 5/5 to perform stepping and squatting activities without excessive femoral IR/ADD  Pt will improve SLS to >20s to improve safety and independence with gait on uneven surfaces such as grass  Pt will be independent and compliant with comprehensive HEP to maintain progress achieved in PT    LEFS Score  LEFS Score: 78.75 % (1/22/2024  1:59 PM)      Plan: Patient will be seen for 1-2 x/week or a total of 10 visits over a 90 day period.  Treatment will include: Gait training, Manual Therapy, Neuromuscular Re-education, Self-Care Home Management, Therapeutic Activities, Therapeutic Exercise, and Home Exercise Program instruction  Date: 1/25/2024  TX#: 2/10 Date:1/31/2024                TX#: 3/10 Date:                 TX#: 4/ Date:                 TX#: 5/ Date:   Tx#: 6/   MT: 15'  Gr III superior, inferior PF glide  Knee extension OP  MT: 15'  Gr III superior, inferior PF glide  Knee extension OP   Knee flexion OP      TE: 30'   Nustep, L4 UE/LE seat 11, 5'   Quad set to towel, x20, 5s hold  Straight Leg Raise with QS x20   SB hamstring curl, B x20  Bridging, x20  Standing heel/toe raise x20ea   Hamstring stretch on stair, R 5x15s  TE: 30'   Nustep, L4 UE/LE seat 11, 5'   Quad set to towel, x20, 5s hold  Straight Leg Raise with QS 3 sec x20   Bridging, x20  Standing heel/toe raise x20ea   TKE  with RTB  3 sec hold x 20                     HEP: Quad set, straight leg raise, bridging, hamstring stretch on stair, standing heel/toe raise    Charges: 1 Manual, 2 TE       Total Timed Treatment: 45' min  Total Treatment Time: 45'  min

## 2024-02-02 ENCOUNTER — OFFICE VISIT (OUTPATIENT)
Dept: PHYSICAL THERAPY | Facility: HOSPITAL | Age: 69
End: 2024-02-02
Payer: MEDICARE

## 2024-02-02 PROCEDURE — 97140 MANUAL THERAPY 1/> REGIONS: CPT

## 2024-02-02 PROCEDURE — 97110 THERAPEUTIC EXERCISES: CPT

## 2024-02-02 NOTE — PROGRESS NOTES
Diagnosis:   Orthopedic aftercare (Z47.89)  Primary osteoarthritis of right knee (M17.11)  S/P TKR (total knee replacement) using cement, right (Z96.651)      Referring Provider: Ayden  Date of Evaluation:    1/22/2024    Precautions:  None Next MD visit:   Not Scheduled  Date of Surgery: 12/21/2024   Insurance Primary/Secondary: MEDICARE / [employer]DEMN[place]     # Auth Visits: no auth, 10 per POC            Subjective: Patient reports he has been active all morning. He reports walking around this morning, reporting 3/10 R knee pain. Patient reports he does not always complete HEP everyday.     Pain:   Beginning: 3/10 R knee  End: 2-3/10 R knee      Objective:  2/2/2023:  R Knee ROM:  Prior to joint mobs: Flexion: 110 and Extension: -4  Post joint mobs: Flexion 115 and Extension: -3      At IE:  Palpation: Scar tissue noted along incision, most prominent at distal incision along tibia. Soft tissue restriction at distal quadriceps and hamstring  Edema: at knee jt line R: 42.5 cm, L 40 cm  Sensation: Diminished along lateral scar     AROM: (* denotes performed with pain)   Knee    Flexion: R 107; L 126   Extension: R -5; L 0      PROM: (* denotes performed with pain)   Knee    Flexion: R 110   Extension: R -4      Patellar Mobility/Accessory motion: hypomobility inferior/superior glide R patellofemoral glide, L WNL         Strength/MMT: (* denotes performed with pain)  Hip Knee   Flexion: R 4/5; L 5/5  Extension: R NT/5; L NT/5  Abduction: R 4-/5; L 4+/5  ER: R 4/5; L 5/5 Flexion: R 4/5; L 5/5  Extension: R 4/5; L 5/5         Balance: SLS: R 12 sec, L 7 sec     Functional Mobility:  5x sit<>stand: 14s (5/10 pain), no UE  TUG (AD, time): 9.9 sec,  straight cane no UE for STS       Gait: pt ambulates on level ground with assistive device of straight cane, antalgia, and decreased hip/knee flex or ext RLE .   Stair Negotiation: step to pattern with ascent/descent, lateral stepping with 2UE on R railing. Ascent with LLE,  descent with RLE      Assessment: Patient demonstrates slight improvements in knee extension ROM. Focused therapy on extension based exercises, to improve knee extension needed for proper gait mechanics. Patient requires tactile and verbal cueing to limit posterior lean compensation with toe raises.       Goals: (To be met in 10 visits)   Pt will improve knee extension ROM to 0 deg to allow proper heel strike during gait and terminal knee extension in stance  Pt will improve knee AROM flexion to >120 degrees to improve ability to perform sit to stand transfer from low couch.   Pt will improve quad strength to 5/5 to ascend 1 flight of stairs reciprocally without UE assist  Pt will increase hip and knee strength to grossly 4+/5 to be able to get up and down from the floor safely  Pt will demonstrate increased hip ER/ABD strength to 5/5 to perform stepping and squatting activities without excessive femoral IR/ADD  Pt will improve SLS to >20s to improve safety and independence with gait on uneven surfaces such as grass  Pt will be independent and compliant with comprehensive HEP to maintain progress achieved in PT    LEFS Score  LEFS Score: 78.75 % (1/22/2024  1:59 PM)      Plan: Patient will be seen for 1-2 x/week or a total of 10 visits over a 90 day period.  Treatment will include: Gait training, Manual Therapy, Neuromuscular Re-education, Self-Care Home Management, Therapeutic Activities, Therapeutic Exercise, and Home Exercise Program instruction. Plan on adding stair ambulation trials and step up's in future sessions.   Date: 1/25/2024  TX#: 2/10 Date:1/31/2024                TX#: 3/10 Date: 2/1/2024                TX#: 4/10 Date:                 TX#: 5/ Date:   Tx#: 6/   MT: 15'  Gr III superior, inferior PF glide  Knee extension OP  MT: 15'  Gr III superior, inferior PF glide  Knee extension OP   Knee flexion OP MT: 15'  Gr III superior, inferior PF glide  Knee extension OP   Knee flexion OP     TE: 30'    Nustep, L4 UE/LE seat 11, 5'   Quad set to towel, x20, 5s hold  Straight Leg Raise with QS x20   SB hamstring curl, B x20  Bridging, x20  Standing heel/toe raise x20ea   Hamstring stretch on stair, R 5x15s  TE: 30'   Nustep, L4 UE/LE seat 11, 5'   Quad set to towel, x20, 5s hold  Straight Leg Raise with QS 3 sec x20   Bridging, x20  Standing heel/toe raise x20ea   TKE with RTB  3 sec hold x 20  TE: 30'   Nustep, L4 UE/LE seat 11, 5'   Quad set to towel, x15, 5s hold  Straight Leg Raise with QS 3 sec x20  Standing heel/toe raise x20ea   TKE with RTB  3 sec hold x 20   S/L hip abduction 2 x 10  Seated hamstring stretch R 5 x 15 sec                   HEP: Quad set, straight leg raise, bridging, hamstring stretch on stair, standing heel/toe raise    Charges: 1 Manual, 2 TE       Total Timed Treatment: 45' min  Total Treatment Time: 45'  min

## 2024-02-06 ENCOUNTER — OFFICE VISIT (OUTPATIENT)
Dept: PHYSICAL THERAPY | Facility: HOSPITAL | Age: 69
End: 2024-02-06
Payer: MEDICARE

## 2024-02-06 PROCEDURE — 97110 THERAPEUTIC EXERCISES: CPT

## 2024-02-06 PROCEDURE — 97140 MANUAL THERAPY 1/> REGIONS: CPT

## 2024-02-07 NOTE — PROGRESS NOTES
Diagnosis:   Orthopedic aftercare (Z47.89)  Primary osteoarthritis of right knee (M17.11)  S/P TKR (total knee replacement) using cement, right (Z96.651)      Referring Provider: Ayden  Date of Evaluation:    1/22/2024    Precautions:  None Next MD visit:   Not Scheduled  Date of Surgery: 12/21/2024   Insurance Primary/Secondary: MEDICARE / [employer] INDEMN[place]     # Auth Visits: no auth, 10 per POC            Subjective: Patient states he is doing well. He reports improved ability to sleep at night. He reports increased WB on his R knee with stair ambulation at home.    Pain:   Beginning: 3-4/10 R knee  End: 2/10 R knee      Objective:  2/6/2023:  R Knee ROM:  Prior to joint mobs: Flexion: 110 and Extension: -4  Post joint mobs: Flexion 115 and Extension: -2      At IE:     AROM: (* denotes performed with pain)   Knee    Flexion: R 107; L 126   Extension: R -5; L 0              Assessment: Patient demonstrates slight increase in knee extension ROM. Therapy continues to focus on improving knee ROM needed for proper gait mechanics and stair ambulation. Patient reports a decrease in pain throughout therapy and with movement.       Goals: (To be met in 10 visits)   Pt will improve knee extension ROM to 0 deg to allow proper heel strike during gait and terminal knee extension in stance  Pt will improve knee AROM flexion to >120 degrees to improve ability to perform sit to stand transfer from low couch.   Pt will improve quad strength to 5/5 to ascend 1 flight of stairs reciprocally without UE assist  Pt will increase hip and knee strength to grossly 4+/5 to be able to get up and down from the floor safely  Pt will demonstrate increased hip ER/ABD strength to 5/5 to perform stepping and squatting activities without excessive femoral IR/ADD  Pt will improve SLS to >20s to improve safety and independence with gait on uneven surfaces such as grass  Pt will be independent and compliant with comprehensive HEP to maintain  progress achieved in PT    LEFS Score  LEFS Score: 78.75 % (1/22/2024  1:59 PM)      Plan: Patient will be seen for 1-2 x/week or a total of 10 visits over a 90 day period.  Treatment will include: Gait training, Manual Therapy, Neuromuscular Re-education, Self-Care Home Management, Therapeutic Activities, Therapeutic Exercise, and Home Exercise Program instruction. Plan on adding stair ambulation trials and step up's in future sessions.   Date: 1/25/2024  TX#: 2/10 Date:1/31/2024                TX#: 3/10 Date: 2/1/2024                TX#: 4/10 Date: 2/6/2024            TX#: 5/10 Date:   Tx#: 6/   MT: 15'  Gr III superior, inferior PF glide  Knee extension OP  MT: 15'  Gr III superior, inferior PF glide  Knee extension OP   Knee flexion OP MT: 15'  Gr III superior, inferior PF glide  Knee extension OP   Knee flexion OP MT: 15'  Gr III superior, inferior PF glide  Knee extension OP   Knee flexion OP    TE: 30'   Nustep, L4 UE/LE seat 11, 5'   Quad set to towel, x20, 5s hold  Straight Leg Raise with QS x20   SB hamstring curl, B x20  Bridging, x20  Standing heel/toe raise x20ea   Hamstring stretch on stair, R 5x15s  TE: 30'   Nustep, L4 UE/LE seat 11, 5'   Quad set to towel, x20, 5s hold  Straight Leg Raise with QS 3 sec x20   Bridging, x20  Standing heel/toe raise x20ea   TKE with RTB  3 sec hold x 20  TE: 30'   Nustep, L4 UE/LE seat 11, 5'   Quad set to towel, x15, 5s hold  Straight Leg Raise with QS 3 sec x20  Standing heel/toe raise x20ea   TKE with RTB  3 sec hold x 20   S/L hip abduction 2 x 10  Seated hamstring stretch R 5 x 15 sec TE: 30'   Quad set to towel, x15, 5s hold  Straight Leg Raise with QS 3 sec x 20  TKE with RTB  3 sec hold x 20   S/L hip abduction 2 x 10  FWD and LAT Step up's 4' R/L LE leading x 10 each  LAQ x 20 R  Hamstring curls GTB x 20                   HEP: Quad set, straight leg raise, bridging, hamstring stretch on stair, standing heel/toe raise    Charges: 1 Manual, 2 TE       Total  Timed Treatment: 45' min  Total Treatment Time: 45'  min

## 2024-02-08 ENCOUNTER — LAB ENCOUNTER (OUTPATIENT)
Dept: LAB | Age: 69
End: 2024-02-08
Attending: FAMILY MEDICINE
Payer: MEDICARE

## 2024-02-08 DIAGNOSIS — Z00.00 ENCOUNTER FOR ANNUAL HEALTH EXAMINATION: ICD-10-CM

## 2024-02-08 LAB
ALBUMIN SERPL-MCNC: 4.3 G/DL (ref 3.2–4.8)
ALBUMIN/GLOB SERPL: 1.4 {RATIO} (ref 1–2)
ALP LIVER SERPL-CCNC: 75 U/L
ALT SERPL-CCNC: 12 U/L
ANION GAP SERPL CALC-SCNC: 5 MMOL/L (ref 0–18)
AST SERPL-CCNC: 19 U/L (ref ?–34)
BASOPHILS # BLD AUTO: 0.03 X10(3) UL (ref 0–0.2)
BASOPHILS NFR BLD AUTO: 0.5 %
BILIRUB SERPL-MCNC: 0.7 MG/DL (ref 0.2–1.1)
BUN BLD-MCNC: 18 MG/DL (ref 9–23)
BUN/CREAT SERPL: 19.1 (ref 10–20)
CALCIUM BLD-MCNC: 9.3 MG/DL (ref 8.7–10.4)
CHLORIDE SERPL-SCNC: 105 MMOL/L (ref 98–112)
CHOLEST SERPL-MCNC: 156 MG/DL (ref ?–200)
CO2 SERPL-SCNC: 31 MMOL/L (ref 21–32)
CREAT BLD-MCNC: 0.94 MG/DL
DEPRECATED RDW RBC AUTO: 44.8 FL (ref 35.1–46.3)
EGFRCR SERPLBLD CKD-EPI 2021: 88 ML/MIN/1.73M2 (ref 60–?)
EOSINOPHIL # BLD AUTO: 0.12 X10(3) UL (ref 0–0.7)
EOSINOPHIL NFR BLD AUTO: 2.1 %
ERYTHROCYTE [DISTWIDTH] IN BLOOD BY AUTOMATED COUNT: 13.2 % (ref 11–15)
EST. AVERAGE GLUCOSE BLD GHB EST-MCNC: 105 MG/DL (ref 68–126)
FASTING PATIENT LIPID ANSWER: YES
FASTING STATUS PATIENT QL REPORTED: YES
GLOBULIN PLAS-MCNC: 3.1 G/DL (ref 2.8–4.4)
GLUCOSE BLD-MCNC: 91 MG/DL (ref 70–99)
HBA1C MFR BLD: 5.3 % (ref ?–5.7)
HCT VFR BLD AUTO: 39.5 %
HDLC SERPL-MCNC: 32 MG/DL (ref 40–59)
HGB BLD-MCNC: 13.5 G/DL
IMM GRANULOCYTES # BLD AUTO: 0.01 X10(3) UL (ref 0–1)
IMM GRANULOCYTES NFR BLD: 0.2 %
LDLC SERPL CALC-MCNC: 106 MG/DL (ref ?–100)
LYMPHOCYTES # BLD AUTO: 1.82 X10(3) UL (ref 1–4)
LYMPHOCYTES NFR BLD AUTO: 31.4 %
MCH RBC QN AUTO: 31.7 PG (ref 26–34)
MCHC RBC AUTO-ENTMCNC: 34.2 G/DL (ref 31–37)
MCV RBC AUTO: 92.7 FL
MONOCYTES # BLD AUTO: 0.61 X10(3) UL (ref 0.1–1)
MONOCYTES NFR BLD AUTO: 10.5 %
NEUTROPHILS # BLD AUTO: 3.21 X10 (3) UL (ref 1.5–7.7)
NEUTROPHILS # BLD AUTO: 3.21 X10(3) UL (ref 1.5–7.7)
NEUTROPHILS NFR BLD AUTO: 55.3 %
NONHDLC SERPL-MCNC: 124 MG/DL (ref ?–130)
OSMOLALITY SERPL CALC.SUM OF ELEC: 293 MOSM/KG (ref 275–295)
PLATELET # BLD AUTO: 198 10(3)UL (ref 150–450)
POTASSIUM SERPL-SCNC: 4.2 MMOL/L (ref 3.5–5.1)
PROT SERPL-MCNC: 7.4 G/DL (ref 5.7–8.2)
RBC # BLD AUTO: 4.26 X10(6)UL
SODIUM SERPL-SCNC: 141 MMOL/L (ref 136–145)
TRIGL SERPL-MCNC: 97 MG/DL (ref 30–149)
TSI SER-ACNC: 3.34 MIU/ML (ref 0.55–4.78)
VLDLC SERPL CALC-MCNC: 16 MG/DL (ref 0–30)
WBC # BLD AUTO: 5.8 X10(3) UL (ref 4–11)

## 2024-02-08 PROCEDURE — 83036 HEMOGLOBIN GLYCOSYLATED A1C: CPT

## 2024-02-08 PROCEDURE — 80053 COMPREHEN METABOLIC PANEL: CPT

## 2024-02-08 PROCEDURE — 84443 ASSAY THYROID STIM HORMONE: CPT

## 2024-02-08 PROCEDURE — 36415 COLL VENOUS BLD VENIPUNCTURE: CPT

## 2024-02-08 PROCEDURE — 80061 LIPID PANEL: CPT

## 2024-02-08 PROCEDURE — 85025 COMPLETE CBC W/AUTO DIFF WBC: CPT

## 2024-02-09 ENCOUNTER — OFFICE VISIT (OUTPATIENT)
Dept: PHYSICAL THERAPY | Facility: HOSPITAL | Age: 69
End: 2024-02-09
Payer: MEDICARE

## 2024-02-09 PROCEDURE — 97110 THERAPEUTIC EXERCISES: CPT

## 2024-02-09 PROCEDURE — 97140 MANUAL THERAPY 1/> REGIONS: CPT

## 2024-02-09 NOTE — PROGRESS NOTES
Diagnosis:   Orthopedic aftercare (Z47.89)  Primary osteoarthritis of right knee (M17.11)  S/P TKR (total knee replacement) using cement, right (Z96.651)      Referring Provider: Ayden  Date of Evaluation:    2024    Precautions:  None Next MD visit:   Not Scheduled  Date of Surgery: 2024   Insurance Primary/Secondary: MEDICARE / [employer]DEMN[place]     # Auth Visits: no auth, 10 per POC            Subjective: Not using straight cane anymore, feels like the knee is getting better but slower than he wanted. Has not taken anything for pain in the last week.     Pain:   Beginnin-3/10 R knee  End: 0/10    Objective:  2023:  R Knee ROM:  Prior to joint mobs: Flexion: 110 and Extension: -4  Post joint mobs: Flexion 115 and Extension: -2      At IE:     AROM: (* denotes performed with pain)   Knee    Flexion: R 107; L 126   Extension: R -5; L 0           Assessment: Updating HEP in session to progress strength training, pt able to perform all with good form and without question. Continued limitation noted with eccentric lowering from step down, pt often compensating with trunk, continue quadriceps strengthening to improve stair negotiation.l       Goals: (To be met in 10 visits)   Pt will improve knee extension ROM to 0 deg to allow proper heel strike during gait and terminal knee extension in stance  Pt will improve knee AROM flexion to >120 degrees to improve ability to perform sit to stand transfer from low couch.   Pt will improve quad strength to 5/5 to ascend 1 flight of stairs reciprocally without UE assist  Pt will increase hip and knee strength to grossly 4+/5 to be able to get up and down from the floor safely  Pt will demonstrate increased hip ER/ABD strength to 5/5 to perform stepping and squatting activities without excessive femoral IR/ADD  Pt will improve SLS to >20s to improve safety and independence with gait on uneven surfaces such as grass  Pt will be independent and compliant with  comprehensive HEP to maintain progress achieved in PT    LEFS Score  LEFS Score: 78.75 % (1/22/2024  1:59 PM)      Plan: Leg Press, step up progression, end range knee extension    Date: 1/25/2024  TX#: 2/10 Date:1/31/2024                TX#: 3/10 Date: 2/1/2024                TX#: 4/10 Date: 2/6/2024            TX#: 5/10 Date: 2/9/2024   Tx#: 6/10   MT: 15'  Gr III superior, inferior PF glide  Knee extension OP  MT: 15'  Gr III superior, inferior PF glide  Knee extension OP   Knee flexion OP MT: 15'  Gr III superior, inferior PF glide  Knee extension OP   Knee flexion OP MT: 15'  Gr III superior, inferior PF glide  Knee extension OP   Knee flexion OP MT: 12'  Scar mobilization  Gr III superior, inferior PF glide  Knee extension OP    TE: 30'   Nustep, L4 UE/LE seat 11, 5'   Quad set to towel, x20, 5s hold  Straight Leg Raise with QS x20   SB hamstring curl, B x20  Bridging, x20  Standing heel/toe raise x20ea   Hamstring stretch on stair, R 5x15s  TE: 30'   Nustep, L4 UE/LE seat 11, 5'   Quad set to towel, x20, 5s hold  Straight Leg Raise with QS 3 sec x20   Bridging, x20  Standing heel/toe raise x20ea   TKE with RTB  3 sec hold x 20  TE: 30'   Nustep, L4 UE/LE seat 11, 5'   Quad set to towel, x15, 5s hold  Straight Leg Raise with QS 3 sec x20  Standing heel/toe raise x20ea   TKE with RTB  3 sec hold x 20   S/L hip abduction 2 x 10  Seated hamstring stretch R 5 x 15 sec TE: 30'   Quad set to towel, x15, 5s hold  Straight Leg Raise with QS 3 sec x 20  TKE with RTB  3 sec hold x 20   S/L hip abduction 2 x 10  FWD and LAT Step up's 4' R/L LE leading x 10 each  LAQ x 20 R  Hamstring curls GTB x 20  TE: 33'  Scifit, L2, 5' seat 11   Prone TKE (difficult) x25   Prone hamstring curl, AROM x30  Prone quadriceps stretch with strap 10s x7   HEP update  Side stepping RTB x10 step ea x3   Lateral 4 in step up, RLE 2x10   Attemping 4 in step up/over, held d/t pain                    HEP: Quad set, straight leg raise, bridging,  hamstring stretch on stair, standing heel/toe raise  Access Code: [email or web address]  - Prone Terminal Knee Extension  - 1 x daily - 7 x weekly - 3 sets - 10 reps  - Prone Knee Flexion Extension AROM  - 1 x daily - 7 x weekly - 3 sets - 10 reps  - Prone Quadriceps Stretch with Strap  - 1 x daily - 7 x weekly - 10 sets - 10 seconds hold  - Side Stepping with Resistance at Ankles  - 1 x daily - 7 x weekly - 3 sets - 10 reps    Charges: 1 Manual, 2 TE       Total Timed Treatment: 45' min  Total Treatment Time: 45'  min

## 2024-02-12 ENCOUNTER — OFFICE VISIT (OUTPATIENT)
Dept: PHYSICAL THERAPY | Facility: HOSPITAL | Age: 69
End: 2024-02-12
Payer: MEDICARE

## 2024-02-12 ENCOUNTER — TELEPHONE (OUTPATIENT)
Dept: PHYSICAL THERAPY | Facility: HOSPITAL | Age: 69
End: 2024-02-12

## 2024-02-12 PROCEDURE — 97110 THERAPEUTIC EXERCISES: CPT

## 2024-02-12 PROCEDURE — 97140 MANUAL THERAPY 1/> REGIONS: CPT

## 2024-02-12 NOTE — PROGRESS NOTES
Diagnosis:   Orthopedic aftercare (Z47.89)  Primary osteoarthritis of right knee (M17.11)  S/P TKR (total knee replacement) using cement, right (Z96.651)      Referring Provider: Ayden  Date of Evaluation:    2024    Precautions:  None Next MD visit:   Not Scheduled  Date of Surgery: 2024   Insurance Primary/Secondary: MEDICARE / [employer]DEMN[place]     # Auth Visits: no auth, 10 per POC            Subjective: Always feels better after leaving therapy but then gets pain when the knee stiffens up again out of therapy. States he can walk about an hour before needing to sit, in order to return to work he needs to be able to be on his feet all day.     Pain:   Beginnin-3/10 R knee  End: 0/10    Objective:  2023:  R Knee ROM:  Prior to joint mobs: Flexion: 110 and Extension: -4  Post joint mobs: Flexion 115 and Extension: -2    Knee AROM: (* denotes performed with pain)   Evaluation    Flexion: R 107; L 126   Extension: R -5; L 0        Strength/MMT: (* denotes performed with pain)  Hip Knee   Flexion: R 4/5; L 5/5  Extension: R NT/5; L NT/5  Abduction: R 4-/5; L 4+/5  ER: R 4/5; L 5/5 Flexion: R 4/5; L 5/5  Extension: R 4/5; L 5/5         Balance: SLS: R 12 sec, L 7 sec     Functional Mobility:  5x sit<>stand: 14s (5/10 pain), no UE  TUG (AD, time): 9.9 sec,  straight cane no UE for STS       Gait: pt ambulates on level ground with assistive device of straight cane, antalgia, and decreased hip/knee flex or ext RLE .   Stair Negotiation: step to pattern with ascent/descent, lateral stepping with 2UE on R railing. Ascent with LLE, descent with RLE      Assessment: Introducing leg press in session for gluteal and quadriceps strengthening, pt tolerates well with focus on terminal knee extension. Soft tissue restrictions noted through distal and lateral quadriceps, will benefit from continued STM and stretching to address.     Goals: (To be met in 10 visits)   Pt will improve knee extension ROM to 0 deg to  allow proper heel strike during gait and terminal knee extension in stance  Pt will improve knee AROM flexion to >120 degrees to improve ability to perform sit to stand transfer from low couch.   Pt will improve quad strength to 5/5 to ascend 1 flight of stairs reciprocally without UE assist  Pt will increase hip and knee strength to grossly 4+/5 to be able to get up and down from the floor safely  Pt will demonstrate increased hip ER/ABD strength to 5/5 to perform stepping and squatting activities without excessive femoral IR/ADD  Pt will improve SLS to >20s to improve safety and independence with gait on uneven surfaces such as grass  Pt will be independent and compliant with comprehensive HEP to maintain progress achieved in PT    LEFS Score  LEFS Score: 78.75 % (1/22/2024  1:59 PM)      Plan: Leg Press, step up progression, end range knee extension    Date: 1/25/2024  TX#: 2/10 Date:1/31/2024                TX#: 3/10 Date: 2/1/2024                TX#: 4/10 Date: 2/6/2024            TX#: 5/10 Date: 2/9/2024   Tx#: 6/10 Date: 2/12/2024   Tx: 7/10   MT: 15'  Gr III superior, inferior PF glide  Knee extension OP  MT: 15'  Gr III superior, inferior PF glide  Knee extension OP   Knee flexion OP MT: 15'  Gr III superior, inferior PF glide  Knee extension OP   Knee flexion OP MT: 15'  Gr III superior, inferior PF glide  Knee extension OP   Knee flexion OP MT: 12'  Scar mobilization  Gr III superior, inferior PF glide  Knee extension OP  MT: 15'  STM lateral/distal quadriceps  Gr III superior, inferior PF glide  Knee extension OP   TE: 30'   Nustep, L4 UE/LE seat 11, 5'   Quad set to towel, x20, 5s hold  Straight Leg Raise with QS x20   SB hamstring curl, B x20  Bridging, x20  Standing heel/toe raise x20ea   Hamstring stretch on stair, R 5x15s  TE: 30'   Nustep, L4 UE/LE seat 11, 5'   Quad set to towel, x20, 5s hold  Straight Leg Raise with QS 3 sec x20   Bridging, x20  Standing heel/toe raise x20ea   TKE with RTB  3 sec  hold x 20  TE: 30'   Nustep, L4 UE/LE seat 11, 5'   Quad set to towel, x15, 5s hold  Straight Leg Raise with QS 3 sec x20  Standing heel/toe raise x20ea   TKE with RTB  3 sec hold x 20   S/L hip abduction 2 x 10  Seated hamstring stretch R 5 x 15 sec TE: 30'   Quad set to towel, x15, 5s hold  Straight Leg Raise with QS 3 sec x 20  TKE with RTB  3 sec hold x 20   S/L hip abduction 2 x 10  FWD and LAT Step up's 4' R/L LE leading x 10 each  LAQ x 20 R  Hamstring curls GTB x 20  TE: 33'  Scifit, L2, 5' seat 11   Prone TKE (difficult) x25   Prone hamstring curl, AROM x30  Prone quadriceps stretch with strap 10s x7   HEP update  Side stepping RTB x10 step ea x3   Lateral 4 in step up, RLE 2x10   Attemping 4 in step up/over, held d/t pain    TE: 30'  Scifit, L2.5, 5' seat 9  Double Leg Press, 62# x40  Single Leg Press, 42#, RLE x30  SB hamstring curl x25  Calf stretch on wedge 3x30s   4 in tap down, RLE stance (fatiguing) 2x10                   HEP: Quad set, straight leg raise, bridging, hamstring stretch on stair, standing heel/toe raise  Access Code: [email or web address]  - Prone Terminal Knee Extension  - 1 x daily - 7 x weekly - 3 sets - 10 reps  - Prone Knee Flexion Extension AROM  - 1 x daily - 7 x weekly - 3 sets - 10 reps  - Prone Quadriceps Stretch with Strap  - 1 x daily - 7 x weekly - 10 sets - 10 seconds hold  - Side Stepping with Resistance at Ankles  - 1 x daily - 7 x weekly - 3 sets - 10 reps    Charges: 1 Manual, 2 TE       Total Timed Treatment: 45' min  Total Treatment Time: 45'  min

## 2024-02-13 ENCOUNTER — OFFICE VISIT (OUTPATIENT)
Dept: FAMILY MEDICINE CLINIC | Facility: CLINIC | Age: 69
End: 2024-02-13

## 2024-02-13 VITALS
SYSTOLIC BLOOD PRESSURE: 128 MMHG | BODY MASS INDEX: 27 KG/M2 | HEART RATE: 87 BPM | WEIGHT: 179 LBS | DIASTOLIC BLOOD PRESSURE: 68 MMHG

## 2024-02-13 DIAGNOSIS — E83.51 HYPOCALCEMIA: ICD-10-CM

## 2024-02-13 DIAGNOSIS — D69.6 THROMBOCYTOPENIA (HCC): ICD-10-CM

## 2024-02-13 DIAGNOSIS — E78.00 HYPERCHOLESTEREMIA: ICD-10-CM

## 2024-02-13 DIAGNOSIS — K21.9 GASTROESOPHAGEAL REFLUX DISEASE WITHOUT ESOPHAGITIS: ICD-10-CM

## 2024-02-13 DIAGNOSIS — N13.8 BPH WITH OBSTRUCTION/LOWER URINARY TRACT SYMPTOMS: ICD-10-CM

## 2024-02-13 DIAGNOSIS — N40.1 BPH WITH OBSTRUCTION/LOWER URINARY TRACT SYMPTOMS: ICD-10-CM

## 2024-02-13 DIAGNOSIS — Z00.00 ENCOUNTER FOR ANNUAL HEALTH EXAMINATION: Primary | ICD-10-CM

## 2024-02-13 DIAGNOSIS — Z96.651 PRESENCE OF RIGHT ARTIFICIAL KNEE JOINT: ICD-10-CM

## 2024-02-13 DIAGNOSIS — K40.90 HERNIA, INGUINAL, LEFT: ICD-10-CM

## 2024-02-13 DIAGNOSIS — Z23 NEEDS FLU SHOT: ICD-10-CM

## 2024-02-13 DIAGNOSIS — M17.11 PRIMARY OSTEOARTHRITIS OF RIGHT KNEE: ICD-10-CM

## 2024-02-13 DIAGNOSIS — N52.9 ERECTILE DYSFUNCTION, UNSPECIFIED ERECTILE DYSFUNCTION TYPE: ICD-10-CM

## 2024-02-13 DIAGNOSIS — Z23 NEED FOR SHINGLES VACCINE: ICD-10-CM

## 2024-02-13 DIAGNOSIS — E55.9 VITAMIN D DEFICIENCY: ICD-10-CM

## 2024-02-13 DIAGNOSIS — K63.5 POLYP OF COLON, UNSPECIFIED PART OF COLON, UNSPECIFIED TYPE: ICD-10-CM

## 2024-02-13 DIAGNOSIS — K64.9 HEMORRHOIDS, UNSPECIFIED HEMORRHOID TYPE: ICD-10-CM

## 2024-02-13 PROBLEM — L30.9 DERMATITIS: Status: RESOLVED | Noted: 2023-03-06 | Resolved: 2024-02-13

## 2024-02-13 PROBLEM — G89.29 CHRONIC PAIN OF RIGHT KNEE: Status: RESOLVED | Noted: 2022-01-07 | Resolved: 2024-02-13

## 2024-02-13 PROBLEM — M25.561 CHRONIC PAIN OF RIGHT KNEE: Status: RESOLVED | Noted: 2022-01-07 | Resolved: 2024-02-13

## 2024-02-13 PROCEDURE — 90662 IIV NO PRSV INCREASED AG IM: CPT | Performed by: FAMILY MEDICINE

## 2024-02-13 PROCEDURE — G0439 PPPS, SUBSEQ VISIT: HCPCS | Performed by: FAMILY MEDICINE

## 2024-02-13 PROCEDURE — G0008 ADMIN INFLUENZA VIRUS VAC: HCPCS | Performed by: FAMILY MEDICINE

## 2024-02-13 RX ORDER — ZOSTER VACCINE RECOMBINANT, ADJUVANTED 50 MCG/0.5
0.5 KIT INTRAMUSCULAR ONCE
Qty: 1 EACH | Refills: 0 | Status: SHIPPED | OUTPATIENT
Start: 2024-02-13 | End: 2024-02-13

## 2024-02-13 RX ORDER — HYDROCORTISONE ACETATE 25 MG/1
25 SUPPOSITORY RECTAL 2 TIMES DAILY
Qty: 28 SUPPOSITORY | Refills: 0 | Status: SHIPPED | OUTPATIENT
Start: 2024-02-13

## 2024-02-13 NOTE — PROGRESS NOTES
Subjective:   Ashutosh Pike is a 68 year old male who presents for a Medicare Subsequent Annual Wellness visit (Pt already had Initial Annual Wellness) and scheduled follow up of multiple significant but stable problems.   S/P R TKA.     History/Other:   Fall Risk Assessment:   He has been screened for Falls and is low risk.      Cognitive Assessment:   Abnormal  What day of the week is this?: Correct  What month is it?: Correct  What year is it?: Correct  Recall \"Ball\": Correct  Recall \"Flag\": Incorrect  Recall \"Tree\": Correct    Functional Ability/Status:   Ashutosh Pike has some abnormal functions as listed below:  He has difficulties Affording Meds based on screening of functional status. He has problems with Memory based on screening of functional status.       Depression Screening (PHQ-2/PHQ-9): PHQ-2 SCORE: 0  , done 2024             Advanced Directives:   He does NOT have a Living Will. [Do you have a living will?: No]  He does NOT have a Power of  for Health Care. [Do you have a healthcare power of ?: No]  Not discussed      Patient Active Problem List   Diagnosis    Hypercholesteremia    Vitamin D deficiency    Hypocalcemia    BPH with obstruction/lower urinary tract symptoms    Thrombocytopenia (HCC)    Erectile dysfunction    Gastroesophageal reflux disease without esophagitis    Internal hemorrhoids    Polyp of colon    Primary osteoarthritis of right knee    Presence of right artificial knee joint     Allergies:  He has No Known Allergies.    Current Medications:  Outpatient Medications Marked as Taking for the 24 encounter (Office Visit) with Sharron Stevens MD   Medication Sig    [] Zoster Vac Recomb Adjuvanted (SHINGRIX) 50 MCG/0.5ML Intramuscular Recon Susp Inject 0.5 mL into the muscle one time for 1 dose.    hydrocortisone 25 MG Rectal Suppos Place 1 suppository (25 mg total) rectally 2 (two) times daily. For 14 days    Multiple Vitamin (MULTIVITAMIN)  Oral Tab Take 1 tablet by mouth daily.    TAMSULOSIN 0.4 MG Oral Cap TAKE 2 CAPSULES BY MOUTH DAILY (Patient taking differently: Take 2 capsules (0.8 mg total) by mouth at bedtime.)    B Complex Vitamins (B COMPLEX OR) Take by mouth.    pantoprazole 40 MG Oral Tab EC Take 1 tablet (40 mg total) by mouth every morning before breakfast.    ergocalciferol 1.25 MG (03068 UT) Oral Cap Take by mouth every 7 days.       Medical History:  He  has a past medical history of Anxiety state, Colon polyps (), High cholesterol, Hypercholesteremia (2022), Osteoarthritis, PONV (postoperative nausea and vomiting), and Prediabetes.  Surgical History:  He  has a past surgical history that includes other surgical history (); other surgical history (); appendectomy (); hernia surgery (); and colonoscopy (N/A, 2023).   Family History:  His family history is not on file.  Social History:  He  reports that he quit smoking about 21 years ago. His smoking use included cigarettes. He has never been exposed to tobacco smoke. He has never used smokeless tobacco. He reports current alcohol use. He reports that he does not use drugs.    Tobacco:  He smoked tobacco in the past but quit greater than 12 months ago.  Social History    Tobacco Use      Smoking status: Former        Types: Cigarettes        Quit date: 2003        Years since quittin.1        Passive exposure: Never      Smokeless tobacco: Never       CAGE Alcohol Screen:   CAGE screening score of 0 on 2024, showing low risk of alcohol abuse.      Patient Care Team:  Sharron Stevens MD as PCP - General (Family Medicine)  Gaby Nash PT as Physical Therapist (Physical Therapy)  Joon Garcia PT as Physical Therapist (Physical Therapy)    Review of Systems     Negative except PER HPI     Objective:   Physical Exam  General appearance: alert  HEENT: Normocephalic, without obvious abnormality, atraumatic. PERRL, EOMI, pink conjunctiva.    Neck: supple, symmetrical, trachea midline, no adenopathy, no JVD and thyroid not enlarged,  Lungs: respirations unlabored, clear to auscultation bilaterally  Heart: regular rate and rhythm, S1, S2 normal, no murmur  Abdomen: normoactive bowel sounds x4; + R side groin w bulging w valsalva  Extremities: extremities normal, atraumatic, no cyanosis or edema; no erythema or tenderness in calves bilaterally  Neurologic: alert, oriented x3    /68 (BP Location: Right arm, Patient Position: Sitting, Cuff Size: adult)   Pulse 87   Wt 179 lb (81.2 kg)   BMI 27.22 kg/m²  Estimated body mass index is 27.22 kg/m² as calculated from the following:    Height as of 12/5/23: 5' 8\" (1.727 m).    Weight as of this encounter: 179 lb (81.2 kg).    Medicare Hearing Assessment:   Hearing Screening    Time taken: 2/13/2024  2:21 PM  Screening Method: Questionnaire  I have a problem hearing over the telephone: No I have trouble following the conversations when two or more people are talking at the same time: No   I have trouble understanding things on the TV: No I have to strain to understand conversations: No   I have to worry about missing the telephone ring or doorbell: No I have trouble hearing conversations in a noisy background such as a crowded room or restaurant: No   I get confused about where sounds come from: No I misunderstand some words in a sentence and need to ask people to repeat themselves: No   I especially have trouble understanding the speech of women and children: No I have trouble understanding the speaker in a large room such as at a meeting or place of Scientologist: No   Many people I talk to seem to mumble (or don't speak clearly): No People get annoyed because I misunderstand what they say: No   I misunderstand what others are saying and make inappropriate responses: No I avoid social activities because I cannot hear well and fear I will reply improperly: No   Family members and friends have told me they  think I may have hearing loss: No               Vision testing not required for subsequent Medicare annual exam      Assessment & Plan:   Ashutosh Pike is a 68 year old male who presents for a Medicare Assessment.     1. Encounter for annual health examination  -Medical, surgical and social history, as well as medications and allergies were reviewed with patient.  Labs reviewed    2. Thrombocytopenia (HCC)  - Stable condition, continue present management.      3. Hypercholesteremia    - Stable condition, continue present management.      4. Vitamin D deficiency    - Stable condition, continue present management.      5. Polyp of colon, unspecified part of colon, unspecified type    - Stable condition, continue present management.      7. Gastroesophageal reflux disease without esophagitis    - Stable condition, continue present management.      8. Primary osteoarthritis of right knee    - Stable condition, continue present management.      9. Presence of right artificial knee joint    - Stable condition, continue present management.      10. Hypocalcemia    - Stable condition, continue present management.      11. Erectile dysfunction, unspecified erectile dysfunction type    - Stable condition, continue present management.      12. BPH with obstruction/lower urinary tract symptoms    - [place] [facility]) - Adult & Peds    13. Needs flu shot    - High Dose Fluzone 65 yr and older PFS [67190]    14. Need for shingles vaccine    - Zoster Vac Recomb Adjuvanted (SHINGRIX) 50 MCG/0.5ML Intramuscular Recon Susp; Inject 0.5 mL into the muscle one time for 1 dose.  Dispense: 1 each; Refill: 0    15. Hemorrhoids, unspecified hemorrhoid type    - hydrocortisone 25 MG Rectal Suppos; Place 1 suppository (25 mg total) rectally 2 (two) times daily. For 14 days  Dispense: 28 suppository; Refill: 0    16. Hernia, inguinal, left    - Urology Referral - [facility])    The patient  indicates understanding of these issues and agrees to the plan.  Reinforced healthy diet, lifestyle, and exercise.      No follow-ups on file.     Sharron Stevens MD, 2/13/2024     Supplementary Documentation:   General Health:  In the past six months, have you lost more than 10 pounds without trying?: 2 - No  Has your appetite been poor?: No  Type of Diet: Balanced  How does the patient maintain a good energy level?: Stretching  How would you describe your daily physical activity?: Moderate  How would you describe your current health state?: Fair  How do you maintain positive mental well-being?: Visiting Family  On a scale of 0 to 10, with 0 being no pain and 10 being severe pain, what is your pain level?: 3 - (Mild)  In the past six months, have you experienced urine leakage?: 0-No  At any time do you feel concerned for the safety/well-being of yourself and/or your children, in your home or elsewhere?: No  Have you had any immunizations at another office such as Influenza, Hepatitis B, Tetanus, or Pneumococcal?: No        Ashutosh Pike's SCREENING SCHEDULE   Tests on this list are recommended by your physician but may not be covered, or covered at this frequency, by your insurer.   Please check with your insurance carrier before scheduling to verify coverage.   PREVENTATIVE SERVICES FREQUENCY &  COVERAGE DETAILS LAST COMPLETION DATE   Diabetes Screening    Fasting Blood Sugar / Glucose    One screening every 12 months if never tested or if previously tested but not diagnosed with pre-diabetes   One screening every 6 months if diagnosed with pre-diabetes Lab Results   Component Value Date    GLU 91 02/08/2024        Cardiovascular Disease Screening    Lipid Panel  Cholesterol  Lipoprotein (HDL)  Triglycerides Covered every 5 years for all Medicare beneficiaries without apparent signs or symptoms of cardiovascular disease Lab Results   Component Value Date    CHOLEST 156 02/08/2024    HDL 32 (L) 02/08/2024      (H) 02/08/2024    TRIG 97 02/08/2024         Electrocardiogram (EKG)   Covered if needed at Welcome to Medicare, and non-screening if indicated for medical reasons 11/21/2023      Ultrasound Screening for Abdominal Aortic Aneurysm (AAA) Covered once in a lifetime for one of the following risk factors    Men who are 65-75 years old and have ever smoked    Anyone with a family history -     Colorectal Cancer Screening  Covered for ages 50-85; only need ONE of the following:    Colonoscopy   Covered every 10 years    Covered every 2 years if patient is at high risk or previous colonoscopy was abnormal 05/09/2023    Health Maintenance   Topic Date Due    Colorectal Cancer Screening  05/09/2026       Flexible Sigmoidoscopy   Covered every 4 years -    Fecal Occult Blood Test Covered annually -   Prostate Cancer Screening    Prostate-Specific Antigen (PSA) Annually No results found for: \"PSA\"  Health Maintenance   Topic Date Due    PSA  12/13/2024      Immunizations    Influenza Covered once per flu season  Please get every year 02/13/2024  No recommendations at this time    Pneumococcal Each vaccine ([id number] & [id number]) covered once after 65 Prevnar 13: -    [id number]: 05/01/2020     No recommendations at this time    Hepatitis B One screening covered for patients with certain risk factors   -  No recommendations at this time    Tetanus Toxoid Not covered by Medicare Part B unless medically necessary (cut with metal); may be covered with your pharmacy prescription benefits -    Tetanus, Diptheria and Pertusis TD and TDaP Not covered by Medicare Part B -  No recommendations at this time    Zoster Not covered by Medicare Part B; may be covered with your pharmacy  prescription benefits -  Zoster Vaccines(1 of 2) Never done

## 2024-02-14 ENCOUNTER — OFFICE VISIT (OUTPATIENT)
Dept: PHYSICAL THERAPY | Facility: HOSPITAL | Age: 69
End: 2024-02-14
Payer: MEDICARE

## 2024-02-14 PROCEDURE — 97140 MANUAL THERAPY 1/> REGIONS: CPT

## 2024-02-14 PROCEDURE — 97110 THERAPEUTIC EXERCISES: CPT

## 2024-02-14 NOTE — PROGRESS NOTES
Diagnosis:   Orthopedic aftercare (Z47.89)  Primary osteoarthritis of right knee (M17.11)  S/P TKR (total knee replacement) using cement, right (Z96.651)      Referring Provider: Ayden  Date of Evaluation:    2024    Precautions:  None Next MD visit:   Not Scheduled  Date of Surgery: 2024   Insurance Primary/Secondary: MEDICARE / [place] INDEMN[place]     # Auth Visits: no auth, 10 per POC            Subjective: Patient reports he is doing a little better everyday.     Pain:   Beginnin/10 R knee  End: 0/10 R knee    Objective:  2023:  R Knee AROM:  Prior to joint mobs: Flexion: 114 and Extension: -3  Post joint mobs: Flexion 114 and Extension: -1    Knee AROM: (* denotes performed with pain)   Evaluation    Flexion: R 107; L 126   Extension: R -5; L 0        Strength/MMT: (* denotes performed with pain)  Hip Knee   Flexion: R 4/5; L 5/5  Extension: R NT/5; L NT/5  Abduction: R 4-/5; L 4+/5  ER: R 4/5; L 5/5 Flexion: R 4/5; L 5/5  Extension: R 4/5; L 5/5         Balance: SLS: R 12 sec, L 7 sec     Functional Mobility:  5x sit<>stand: 14s (5/10 pain), no UE  TUG (AD, time): 9.9 sec,  straight cane no UE for STS       Gait: pt ambulates on level ground with assistive device of straight cane, antalgia, and decreased hip/knee flex or ext RLE .   Stair Negotiation: step to pattern with ascent/descent, lateral stepping with 2UE on R railing. Ascent with LLE, descent with RLE      Assessment: Patient is able to complete 4 inch step up's whereas, in other session was unable. Added eccentric heel taps, patient has significant difficulty with this exercise, requiring forward lean and BUE support to complete.  He demonstrates continued quad weakness, continue to progress strength and flexibility of quad.     Goals: (To be met in 10 visits)   Pt will improve knee extension ROM to 0 deg to allow proper heel strike during gait and terminal knee extension in stance  Pt will improve knee AROM flexion to >120 degrees  to improve ability to perform sit to stand transfer from low couch.   Pt will improve quad strength to 5/5 to ascend 1 flight of stairs reciprocally without UE assist  Pt will increase hip and knee strength to grossly 4+/5 to be able to get up and down from the floor safely  Pt will demonstrate increased hip ER/ABD strength to 5/5 to perform stepping and squatting activities without excessive femoral IR/ADD  Pt will improve SLS to >20s to improve safety and independence with gait on uneven surfaces such as grass  Pt will be independent and compliant with comprehensive HEP to maintain progress achieved in PT    LEFS Score  LEFS Score: 78.75 % (1/22/2024  1:59 PM)      Plan: Treatment will include but is not limited to a progression of stretching, strengthening, functional movement training, and manual therapies.  Plan on adding end range knee extension in future sessions.    Date: 1/25/2024  TX#: 2/10 Date:1/31/2024                TX#: 3/10 Date: 2/1/2024                TX#: 4/10 Date: 2/6/2024            TX#: 5/10 Date: 2/9/2024   Tx#: 6/10 Date: 2/12/2024   Tx: 7/10 Date: 2/14/2024  Tx: 8/10   MT: 15'  Gr III superior, inferior PF glide  Knee extension OP  MT: 15'  Gr III superior, inferior PF glide  Knee extension OP   Knee flexion OP MT: 15'  Gr III superior, inferior PF glide  Knee extension OP   Knee flexion OP MT: 15'  Gr III superior, inferior PF glide  Knee extension OP   Knee flexion OP MT: 12'  Scar mobilization  Gr III superior, inferior PF glide  Knee extension OP  MT: 15'  STM lateral/distal quadriceps  Gr III superior, inferior PF glide  Knee extension OP MT: 15'  STM lateral/distal quadriceps  Gr III superior, inferior PF glide  Knee extension OP   TE: 30'   Nustep, L4 UE/LE seat 11, 5'   Quad set to towel, x20, 5s hold  Straight Leg Raise with QS x20   SB hamstring curl, B x20  Bridging, x20  Standing heel/toe raise x20ea   Hamstring stretch on stair, R 5x15s  TE: 30'   Nustep, L4 UE/LE seat 11, 5'    Quad set to towel, x20, 5s hold  Straight Leg Raise with QS 3 sec x20   Bridging, x20  Standing heel/toe raise x20ea   TKE with RTB  3 sec hold x 20  TE: 30'   Nustep, L4 UE/LE seat 11, 5'   Quad set to towel, x15, 5s hold  Straight Leg Raise with QS 3 sec x20  Standing heel/toe raise x20ea   TKE with RTB  3 sec hold x 20   S/L hip abduction 2 x 10  Seated hamstring stretch R 5 x 15 sec TE: 30'   Quad set to towel, x15, 5s hold  Straight Leg Raise with QS 3 sec x 20  TKE with RTB  3 sec hold x 20   S/L hip abduction 2 x 10  FWD and LAT Step up's 4' R/L LE leading x 10 each  LAQ x 20 R  Hamstring curls GTB x 20  TE: 33'  Scifit, L2, 5' seat 11   Prone TKE (difficult) x25   Prone hamstring curl, AROM x30  Prone quadriceps stretch with strap 10s x7   HEP update  Side stepping RTB x10 step ea x3   Lateral 4 in step up, RLE 2x10   Attemping 4 in step up/over, held d/t pain    TE: 30'  Scifit, L2.5, 5' seat 9  Double Leg Press, 62# x40  Single Leg Press, 42#, RLE x30  SB hamstring curl x25  Calf stretch on wedge 3x30s   4 in tap down, RLE stance (fatiguing) 2x10 TE: 30'  Scifit, L2.5, 5' seat 9  Double Leg Press, 67# x40  Single Leg Press, 50#, RLE x30  SB hamstring curl x25  FWD step up 4' x 20   Eccentric step downs 2' 2 x 10 (very difficult)                     HEP: Quad set, straight leg raise, bridging, hamstring stretch on stair, standing heel/toe raise  Access Code: [email or web address]  - Prone Terminal Knee Extension  - 1 x daily - 7 x weekly - 3 sets - 10 reps  - Prone Knee Flexion Extension AROM  - 1 x daily - 7 x weekly - 3 sets - 10 reps  - Prone Quadriceps Stretch with Strap  - 1 x daily - 7 x weekly - 10 sets - 10 seconds hold  - Side Stepping with Resistance at Ankles  - 1 x daily - 7 x weekly - 3 sets - 10 reps    Charges: 1 Manual, 2 TE       Total Timed Treatment: 45' min  Total Treatment Time: 45'  min

## 2024-02-19 ENCOUNTER — OFFICE VISIT (OUTPATIENT)
Dept: PHYSICAL THERAPY | Facility: HOSPITAL | Age: 69
End: 2024-02-19
Payer: MEDICARE

## 2024-02-19 PROCEDURE — 97110 THERAPEUTIC EXERCISES: CPT

## 2024-02-19 PROCEDURE — 97140 MANUAL THERAPY 1/> REGIONS: CPT

## 2024-02-19 NOTE — PROGRESS NOTES
Diagnosis:   Orthopedic aftercare (Z47.89)  Primary osteoarthritis of right knee (M17.11)  S/P TKR (total knee replacement) using cement, right (Z96.651)      Referring Provider: Ayden  Date of Evaluation:    2024    Precautions:  None Next MD visit:   Not Scheduled  Date of Surgery: 2024   Insurance Primary/Secondary: MEDICARE / [place]DEMN[place]     # Auth Visits: no auth, 10 per POC            Subjective: Patient reports he is feeling good. Patient reports stairs are getting easier.    Pain:   Beginnin/10 R knee  End: 0/10 R knee    Objective:  2023:  R Knee AROM:  Prior to joint mobs: Flexion: 115 and Extension: -1  Post joint mobs: Flexion 120 and Extension: 0    Knee AROM: (* denotes performed with pain)   Evaluation    Flexion: R 107; L 126   Extension: R -5; L 0        Strength/MMT: (* denotes performed with pain)  Hip Knee   Flexion: R 4/5; L 5/5  Extension: R NT/5; L NT/5  Abduction: R 4-/5; L 4+/5  ER: R 4/5; L 5/5 Flexion: R 4/5; L 5/5  Extension: R 4/5; L 5/5         Balance: SLS: R 12 sec, L 7 sec     Functional Mobility:  5x sit<>stand: 14s (5/10 pain), no UE  TUG (AD, time): 9.9 sec,  straight cane no UE for STS       Gait: pt ambulates on level ground with assistive device of straight cane, antalgia, and decreased hip/knee flex or ext RLE .   Stair Negotiation: step to pattern with ascent/descent, lateral stepping with 2UE on R railing. Ascent with LLE, descent with RLE      Assessment: Patient demonstrates moderate difficulty with eccentric steps downs. Requiring UE support, having poor form and motor control. With shuttle press activity, instructed patient to perform lowering/knee flexion very slowly to mimic lowering and strength needed for eccentric movements.     Goals: (To be met in 10 visits)   Pt will improve knee extension ROM to 0 deg to allow proper heel strike during gait and terminal knee extension in stance  Pt will improve knee AROM flexion to >120 degrees to  improve ability to perform sit to stand transfer from low couch.   Pt will improve quad strength to 5/5 to ascend 1 flight of stairs reciprocally without UE assist  Pt will increase hip and knee strength to grossly 4+/5 to be able to get up and down from the floor safely  Pt will demonstrate increased hip ER/ABD strength to 5/5 to perform stepping and squatting activities without excessive femoral IR/ADD  Pt will improve SLS to >20s to improve safety and independence with gait on uneven surfaces such as grass  Pt will be independent and compliant with comprehensive HEP to maintain progress achieved in PT    LEFS Score  LEFS Score: 78.75 % (1/22/2024  1:59 PM)      Plan: Treatment will include but is not limited to a progression of stretching, strengthening, functional movement training, and manual therapies.  Plan on adding end range knee extension and progress report next session.    Date: 1/25/2024  TX#: 2/10 Date:1/31/2024                TX#: 3/10 Date: 2/1/2024                TX#: 4/10 Date: 2/6/2024            TX#: 5/10 Date: 2/9/2024   Tx#: 6/10 Date: 2/12/2024   Tx: 7/10 Date: 2/14/2024  Tx: 8/10 Date: 2/19/2024  Tx: 9/10    MT: 15'  Gr III superior, inferior PF glide  Knee extension OP  MT: 15'  Gr III superior, inferior PF glide  Knee extension OP   Knee flexion OP MT: 15'  Gr III superior, inferior PF glide  Knee extension OP   Knee flexion OP MT: 15'  Gr III superior, inferior PF glide  Knee extension OP   Knee flexion OP MT: 12'  Scar mobilization  Gr III superior, inferior PF glide  Knee extension OP  MT: 15'  STM lateral/distal quadriceps  Gr III superior, inferior PF glide  Knee extension OP MT: 15'  STM lateral/distal quadriceps  Gr III superior, inferior PF glide  Knee extension OP MT: 10'  Gr III superior, inferior PF glide  Knee extension/flexion OP  Knee PROM - flex/extend   TE: 30'   Nustep, L4 UE/LE seat 11, 5'   Quad set to towel, x20, 5s hold  Straight Leg Raise with QS x20   SB hamstring  curl, B x20  Bridging, x20  Standing heel/toe raise x20ea   Hamstring stretch on stair, R 5x15s  TE: 30'   Nustep, L4 UE/LE seat 11, 5'   Quad set to towel, x20, 5s hold  Straight Leg Raise with QS 3 sec x20   Bridging, x20  Standing heel/toe raise x20ea   TKE with RTB  3 sec hold x 20  TE: 30'   Nustep, L4 UE/LE seat 11, 5'   Quad set to towel, x15, 5s hold  Straight Leg Raise with QS 3 sec x20  Standing heel/toe raise x20ea   TKE with RTB  3 sec hold x 20   S/L hip abduction 2 x 10  Seated hamstring stretch R 5 x 15 sec TE: 30'   Quad set to towel, x15, 5s hold  Straight Leg Raise with QS 3 sec x 20  TKE with RTB  3 sec hold x 20   S/L hip abduction 2 x 10  FWD and LAT Step up's 4' R/L LE leading x 10 each  LAQ x 20 R  Hamstring curls GTB x 20  TE: 33'  Scifit, L2, 5' seat 11   Prone TKE (difficult) x25   Prone hamstring curl, AROM x30  Prone quadriceps stretch with strap 10s x7   HEP update  Side stepping RTB x10 step ea x3   Lateral 4 in step up, RLE 2x10   Attemping 4 in step up/over, held d/t pain    TE: 30'  Scifit, L2.5, 5' seat 9  Double Leg Press, 62# x40  Single Leg Press, 42#, RLE x30  SB hamstring curl x25  Calf stretch on wedge 3x30s   4 in tap down, RLE stance (fatiguing) 2x10 TE: 30'  Scifit, L2.5, 5' seat 9  Double Leg Press, 67# x40  Single Leg Press, 50#, RLE x30  SB hamstring curl x25  FWD step up 4' x 20   Eccentric step downs 2' 2 x 10 (very difficult) TE: 35'  Double Leg Press, 75# x 20 (slow lowering)   Single Leg Press, 50#, RLE x 20 (slow lowering)  SB hamstring curl x25  Eccentric step downs 2' 2 x 10 (moderate difficult) BUE support  Step up and over 4' x 20 (w/ UE support) Attempted without UE but caused a significant increase in pain  Lateral step up's 4 inch x 20   FWD step up's 6' x 20  Bridges add squeeze 3 sec x 20                          HEP: Quad set, straight leg raise, bridging, hamstring stretch on stair, standing heel/toe raise  Access Code: [email or web address]  - Prone Terminal Knee  Extension  - 1 x daily - 7 x weekly - 3 sets - 10 reps  - Prone Knee Flexion Extension AROM  - 1 x daily - 7 x weekly - 3 sets - 10 reps  - Prone Quadriceps Stretch with Strap  - 1 x daily - 7 x weekly - 10 sets - 10 seconds hold  - Side Stepping with Resistance at Ankles  - 1 x daily - 7 x weekly - 3 sets - 10 reps    Charges: 1 Manual, 2 TE       Total Timed Treatment: 45' min  Total Treatment Time: 45'  min

## 2024-02-21 ENCOUNTER — OFFICE VISIT (OUTPATIENT)
Dept: PHYSICAL THERAPY | Facility: HOSPITAL | Age: 69
End: 2024-02-21
Payer: MEDICARE

## 2024-02-21 PROCEDURE — 97140 MANUAL THERAPY 1/> REGIONS: CPT

## 2024-02-21 PROCEDURE — 97110 THERAPEUTIC EXERCISES: CPT

## 2024-02-21 NOTE — PROGRESS NOTES
Progress Summary  Pt has attended 10 visits in Physical Therapy.      Diagnosis:   Orthopedic aftercare (Z47.89)  Primary osteoarthritis of right knee (M17.11)  S/P TKR (total knee replacement) using cement, right (Z96.651)      Referring Provider: Ayden  Date of Evaluation:    2024    Precautions:  None Next MD visit:   Not Scheduled  Date of Surgery: 2024   Insurance Primary/Secondary: MEDICARE / [employer] [place] INDEMNITY     # Auth Visits: no auth, 10 per POC            Subjective: Patient has been seen for a total of 10 skilled PT visits. He states improvements in ability to walk, reports a decrease in pain and has noticed improvements in ability to ambulate stairs.     Pain:   Beginnin/10 R knee  End: 0/10 R knee    Objective:  2024: [place]  Knee AROM: (* denotes performed with pain)   Evaluation    Flexion: R 120   Extension: R 0        Strength/MMT: (* denotes performed with pain)  Hip Knee   Flexion: R 5/5; L 5/5  Abduction: R 4+/5; L 5/5  ER: R 4+/5; L 5/5 Flexion: R 4+/5; L 5/5  Extension: R 5/5; L 5/5        Balance: SLS: R 13 sec, L 12 sec    At IE:  Knee AROM: (* denotes performed with pain)   Evaluation    Flexion: R 107; L 126   Extension: R -5; L 0        Strength/MMT: (* denotes performed with pain)  Hip Knee   Flexion: R 4/5; L 5/5  Extension: R NT/5; L NT/5  Abduction: R 4-/5; L 4+/5  ER: R 4/5; L 5/5 Flexion: R 4/5; L 5/5  Extension: R 4/5; L 5/5         Balance: SLS: R 12 sec, L 7 sec     Functional Mobility:  5x sit<>stand: 14s (5/10 pain), no UE  TUG (AD, time): 9.9 sec,  straight cane no UE for STS       Gait: pt ambulates on level ground with assistive device of straight cane, antalgia, and decreased hip/knee flex or ext RLE .   Stair Negotiation: step to pattern with ascent/descent, lateral stepping with 2UE on R railing. Ascent with LLE, descent with RLE      Assessment: Patient demonstrates improvements in ROM, strength and balance since initial evaluation. He demonstrates  improvements in stair ambulation and is able to complete stairs in a reciprocal pattern, but reports pain in R knee with stair descent. Patient would continue to benefit from skilled PT services to address remaining strength and balance deficits.     Goals: (To be met in 10 visits)   Pt will improve knee extension ROM to 0 deg to allow proper heel strike during gait and terminal knee extension in stance MET  Pt will improve knee AROM flexion to >120 degrees to improve ability to perform sit to stand transfer from low couch. MET  Pt will improve quad strength to 5/5 to ascend 1 flight of stairs reciprocally without UE assist MET  Pt will increase hip and knee strength to grossly 4+/5 to be able to get up and down from the floor safely progressing  Pt will demonstrate increased hip ER/ABD strength to 5/5 to perform stepping and squatting activities without excessive femoral IR/ADD progressing  Pt will improve SLS to >20s to improve safety and independence with gait on uneven surfaces such as grass progressing  Pt will be independent and compliant with comprehensive HEP to maintain progress achieved in PT Ongoing    LEFS Score  LEFS Score: 78.75 % (1/22/2024  1:59 PM)      Plan: Patient has been seen 1-2 a week for a total of 10 skilled PT visits. Patient continues to demonstrate ADL limitations, and would continue to benefit from skilled PT services. Requesting an additional 5 visits to plan of care, to meet PT goals and return patient to ADL's without strength deficits. Treatment will include but is not limited to a progression of stretching, strengthening, functional movement training, and manual therapies.  Plan on adding end range knee extension and progress report next session.    Date: 1/25/2024  TX#: 2/10 Date:1/31/2024                TX#: 3/10 Date: 2/1/2024                TX#: 4/10 Date: 2/6/2024            TX#: 5/10 Date: 2/9/2024   Tx#: 6/10 Date: 2/12/2024   Tx: 7/10 Date: 2/14/2024  Tx: 8/10 Date:  2/19/2024  Tx: 9/10  Date: 2/21/2024  Tx #: 10/10  AR   MT: 15'  Gr III superior, inferior PF glide  Knee extension OP  MT: 15'  Gr III superior, inferior PF glide  Knee extension OP   Knee flexion OP MT: 15'  Gr III superior, inferior PF glide  Knee extension OP   Knee flexion OP MT: 15'  Gr III superior, inferior PF glide  Knee extension OP   Knee flexion OP MT: 12'  Scar mobilization  Gr III superior, inferior PF glide  Knee extension OP  MT: 15'  STM lateral/distal quadriceps  Gr III superior, inferior PF glide  Knee extension OP MT: 15'  STM lateral/distal quadriceps  Gr III superior, inferior PF glide  Knee extension OP MT: 10'  Gr III superior, inferior PF glide  Knee extension/flexion OP  Knee PROM - flex/extend MT: 10'  Gr III superior, inferior PF glide  Knee extension/flexion OP  Knee PROM - flex/extend   TE: 30'   Nustep, L4 UE/LE seat 11, 5'   Quad set to towel, x20, 5s hold  Straight Leg Raise with QS x20   SB hamstring curl, B x20  Bridging, x20  Standing heel/toe raise x20ea   Hamstring stretch on stair, R 5x15s  TE: 30'   Nustep, L4 UE/LE seat 11, 5'   Quad set to towel, x20, 5s hold  Straight Leg Raise with QS 3 sec x20   Bridging, x20  Standing heel/toe raise x20ea   TKE with RTB  3 sec hold x 20  TE: 30'   Nustep, L4 UE/LE seat 11, 5'   Quad set to towel, x15, 5s hold  Straight Leg Raise with QS 3 sec x20  Standing heel/toe raise x20ea   TKE with RTB  3 sec hold x 20   S/L hip abduction 2 x 10  Seated hamstring stretch R 5 x 15 sec TE: 30'   Quad set to towel, x15, 5s hold  Straight Leg Raise with QS 3 sec x 20  TKE with RTB  3 sec hold x 20   S/L hip abduction 2 x 10  FWD and LAT Step up's 4' R/L LE leading x 10 each  LAQ x 20 R  Hamstring curls GTB x 20  TE: 33'  Scifit, L2, 5' seat 11   Prone TKE (difficult) x25   Prone hamstring curl, AROM x30  Prone quadriceps stretch with strap 10s x7   HEP update  Side stepping RTB x10 step ea x3   Lateral 4 in step up, RLE 2x10   Attemping 4 in step  up/over, held d/t pain    TE: 30'  Scifit, L2.5, 5' seat 9  Double Leg Press, 62# x40  Single Leg Press, 42#, RLE x30  SB hamstring curl x25  Calf stretch on wedge 3x30s   4 in tap down, RLE stance (fatiguing) 2x10 TE: 30'  Scifit, L2.5, 5' seat 9  Double Leg Press, 67# x40  Single Leg Press, 50#, RLE x30  SB hamstring curl x25  FWD step up 4' x 20   Eccentric step downs 2' 2 x 10 (very difficult) TE: 35'  Double Leg Press, 75# x 20 (slow lowering)   Single Leg Press, 50#, RLE x 20 (slow lowering)  SB hamstring curl x25  Eccentric step downs 2' 2 x 10 (moderate difficult) BUE support  Step up and over 4' x 20 (w/ UE support) Attempted without UE but caused a significant increase in pain  Lateral step up's 4 inch x 20   FWD step up's 6' x 20  Bridges add squeeze 3 sec x 20    TE: 30 min  Bridges adduction squeeze 3 sec x 20  Stair ambulation 4' (3 steps) x 5 (no difficulties)  Stair ambulation 6' (2 steps) x 6 (minimal difficulty with descent no difficulty with ascending)  Eccentric step downs FWD and LAT 4' x 20   SLS trials x 3 R/L  Step up and over 4' x 20 (NO UE support today)   Side steps YTB 5 ft x 6                         HEP: Quad set, straight leg raise, bridging, hamstring stretch on stair, standing heel/toe raise  Access Code: [email or web address]  - Prone Terminal Knee Extension  - 1 x daily - 7 x weekly - 3 sets - 10 reps  - Prone Knee Flexion Extension AROM  - 1 x daily - 7 x weekly - 3 sets - 10 reps  - Prone Quadriceps Stretch with Strap  - 1 x daily - 7 x weekly - 10 sets - 10 seconds hold  - Side Stepping with Resistance at Ankles  - 1 x daily - 7 x weekly - 3 sets - 10 reps    Charges: 1 Manual, 2 TE       Total Timed Treatment: 40 min  Total Treatment Time: 40  min

## 2024-02-26 ENCOUNTER — OFFICE VISIT (OUTPATIENT)
Dept: PHYSICAL THERAPY | Facility: HOSPITAL | Age: 69
End: 2024-02-26
Payer: MEDICARE

## 2024-02-26 PROCEDURE — 97110 THERAPEUTIC EXERCISES: CPT

## 2024-02-26 PROCEDURE — 97140 MANUAL THERAPY 1/> REGIONS: CPT

## 2024-02-26 NOTE — PROGRESS NOTES
Diagnosis:   Orthopedic aftercare (Z47.89)  Primary osteoarthritis of right knee (M17.11)  S/P TKR (total knee replacement) using cement, right (Z96.651)      Referring Provider: Ayden  Date of Evaluation:    2024    Precautions:  None Next MD visit:   Not Scheduled  Date of Surgery: 2024   Insurance Primary/Secondary: MEDICARE / [employer] [place] INDEMNITY     # Auth Visits: no auth, 10 per POC            Subjective: Has been feeling really good, biggest limitation at this time is going down the stairs because of anterior knee pain.      Pain:   Beginnin/10 R knee  End: 0/10 R knee    Objective:  2024: [place]  Knee AROM: (* denotes performed with pain)   Evaluation    Flexion: R 120   Extension: R 0        Strength/MMT: (* denotes performed with pain)  Hip Knee   Flexion: R 5/5; L 5/5  Abduction: R 4+/5; L 5/5  ER: R 4+/5; L 5/5 Flexion: R 4+/5; L 5/5  Extension: R 5/5; L 5/5        Balance: SLS: R 13 sec, L 12 sec    At IE:  Knee AROM: (* denotes performed with pain)   Evaluation    Flexion: R 107; L 126   Extension: R -5; L 0        Strength/MMT: (* denotes performed with pain)  Hip Knee   Flexion: R 4/5; L 5/5  Extension: R NT/5; L NT/5  Abduction: R 4-/5; L 4+/5  ER: R 4/5; L 5/5 Flexion: R 4/5; L 5/5  Extension: R 4/5; L 5/5         Balance: SLS: R 12 sec, L 7 sec     Functional Mobility:  5x sit<>stand: 14s (5/10 pain), no UE  TUG (AD, time): 9.9 sec,  straight cane no UE for STS       Gait: pt ambulates on level ground with assistive device of straight cane, antalgia, and decreased hip/knee flex or ext RLE .   Stair Negotiation: step to pattern with ascent/descent, lateral stepping with 2UE on R railing. Ascent with LLE, descent with RLE      Assessment: Responding well in session to soft tissue mobilization at patellar tendon, improving tolerance to step down after manual therapy suggesting soft tissue restrictions leading to pain and stiffness. Pt tolerating exercise well in session, demonstrates  adequate knee flexion to clear 12 inch aden without compensation.      Goals: (To be met in 10 visits)   Pt will improve knee extension ROM to 0 deg to allow proper heel strike during gait and terminal knee extension in stance MET  Pt will improve knee AROM flexion to >120 degrees to improve ability to perform sit to stand transfer from low couch. MET  Pt will improve quad strength to 5/5 to ascend 1 flight of stairs reciprocally without UE assist MET  Pt will increase hip and knee strength to grossly 4+/5 to be able to get up and down from the floor safely progressing  Pt will demonstrate increased hip ER/ABD strength to 5/5 to perform stepping and squatting activities without excessive femoral IR/ADD progressing  Pt will improve SLS to >20s to improve safety and independence with gait on uneven surfaces such as grass progressing  Pt will be independent and compliant with comprehensive HEP to maintain progress achieved in PT Ongoing    LEFS Score  LEFS Score: 78.75 % (1/22/2024  1:59 PM)      Plan: Patient has been seen 1-2 a week for a total of 10 skilled PT visits. Patient continues to demonstrate ADL limitations, and would continue to benefit from skilled PT services. Requesting an additional 5 visits to plan of care, to meet PT goals and return patient to ADL's without strength deficits. Treatment will include but is not limited to a progression of stretching, strengthening, functional movement training, and manual therapies.  Plan on adding end range knee extension and progress report next session.    Date:1/31/2024                TX#: 3/10 Date: 2/1/2024                TX#: 4/10 Date: 2/6/2024            TX#: 5/10 Date: 2/9/2024   Tx#: 6/10 Date: 2/12/2024   Tx: 7/10 Date: 2/14/2024  Tx: 8/10 Date: 2/19/2024  Tx: 9/10  Date: 2/21/2024  Tx #: 10/10  TX 2/26/2024   Tx: 11/15   MT: 15'  Gr III superior, inferior PF glide  Knee extension OP   Knee flexion OP MT: 15'  Gr III superior, inferior PF glide  Knee  extension OP   Knee flexion OP MT: 15'  Gr III superior, inferior PF glide  Knee extension OP   Knee flexion OP MT: 12'  Scar mobilization  Gr III superior, inferior PF glide  Knee extension OP  MT: 15'  STM lateral/distal quadriceps  Gr III superior, inferior PF glide  Knee extension OP MT: 15'  STM lateral/distal quadriceps  Gr III superior, inferior PF glide  Knee extension OP MT: 10'  Gr III superior, inferior PF glide  Knee extension/flexion OP  Knee PROM - flex/extend MT: 10'  Gr III superior, inferior PF glide  Knee extension/flexion OP  Knee PROM - flex/extend MT: 10'  STM, deep tendon massage at R patellar tendon   Gr III superior, inferior PF glide     TE: 30'   Nustep, L4 UE/LE seat 11, 5'   Quad set to towel, x20, 5s hold  Straight Leg Raise with QS 3 sec x20   Bridging, x20  Standing heel/toe raise x20ea   TKE with RTB  3 sec hold x 20  TE: 30'   Nustep, L4 UE/LE seat 11, 5'   Quad set to towel, x15, 5s hold  Straight Leg Raise with QS 3 sec x20  Standing heel/toe raise x20ea   TKE with RTB  3 sec hold x 20   S/L hip abduction 2 x 10  Seated hamstring stretch R 5 x 15 sec TE: 30'   Quad set to towel, x15, 5s hold  Straight Leg Raise with QS 3 sec x 20  TKE with RTB  3 sec hold x 20   S/L hip abduction 2 x 10  FWD and LAT Step up's 4' R/L LE leading x 10 each  LAQ x 20 R  Hamstring curls GTB x 20  TE: 33'  Scifit, L2, 5' seat 11   Prone TKE (difficult) x25   Prone hamstring curl, AROM x30  Prone quadriceps stretch with strap 10s x7   HEP update  Side stepping RTB x10 step ea x3   Lateral 4 in step up, RLE 2x10   Attemping 4 in step up/over, held d/t pain    TE: 30'  Scifit, L2.5, 5' seat 9  Double Leg Press, 62# x40  Single Leg Press, 42#, RLE x30  SB hamstring curl x25  Calf stretch on wedge 3x30s   4 in tap down, RLE stance (fatiguing) 2x10 TE: 30'  Scifit, L2.5, 5' seat 9  Double Leg Press, 67# x40  Single Leg Press, 50#, RLE x30  SB hamstring curl x25  FWD step up 4' x 20   Eccentric step downs 2' 2  x 10 (very difficult) TE: 35'  Double Leg Press, 75# x 20 (slow lowering)   Single Leg Press, 50#, RLE x 20 (slow lowering)  SB hamstring curl x25  Eccentric step downs 2' 2 x 10 (moderate difficult) BUE support  Step up and over 4' x 20 (w/ UE support) Attempted without UE but caused a significant increase in pain  Lateral step up's 4 inch x 20   FWD step up's 6' x 20  Bridges add squeeze 3 sec x 20    TE: 30 min  Bridges adduction squeeze 3 sec x 20  Stair ambulation 4' (3 steps) x 5 (no difficulties)  Stair ambulation 6' (2 steps) x 6 (minimal difficulty with descent no difficulty with ascending)  Eccentric step downs FWD and LAT 4' x 20   SLS trials x 3 R/L  Step up and over 4' x 20 (NO UE support today)   Side steps YTB 5 ft x 6 TE: 32'  Scifit, L 2.5 5'   Straight leg raise with QS x15   SB bridging x20   6/12 in aden stepping forward x3 lap   6/12 in aden stepping lateral x3 lap   Standing TKE Blue TB x20, 5s hold   TRX squats, x30  4 in tap downs 2UE, x20  6 in step up/over 1UE (after manual) x10                          HEP: Quad set, straight leg raise, bridging, hamstring stretch on stair, standing heel/toe raise  Access Code: [email or web address]  - Prone Terminal Knee Extension  - 1 x daily - 7 x weekly - 3 sets - 10 reps  - Prone Knee Flexion Extension AROM  - 1 x daily - 7 x weekly - 3 sets - 10 reps  - Prone Quadriceps Stretch with Strap  - 1 x daily - 7 x weekly - 10 sets - 10 seconds hold  - Side Stepping with Resistance at Ankles  - 1 x daily - 7 x weekly - 3 sets - 10 reps    Charges: 1 Manual, 2 TE       Total Timed Treatment: 42 min  Total Treatment Time: 42  min

## 2024-02-28 ENCOUNTER — OFFICE VISIT (OUTPATIENT)
Dept: PHYSICAL THERAPY | Facility: HOSPITAL | Age: 69
End: 2024-02-28
Payer: MEDICARE

## 2024-02-28 PROCEDURE — 97140 MANUAL THERAPY 1/> REGIONS: CPT

## 2024-02-28 PROCEDURE — 97110 THERAPEUTIC EXERCISES: CPT

## 2024-02-28 NOTE — PROGRESS NOTES
Diagnosis:   Orthopedic aftercare (Z47.89)  Primary osteoarthritis of right knee (M17.11)  S/P TKR (total knee replacement) using cement, right (Z96.651)      Referring Provider: Ayden  Date of Evaluation:    2024    Precautions:  None Next MD visit:   Not Scheduled  Date of Surgery: 2024   Insurance Primary/Secondary: MEDICARE / [employer] [place] INDEMNITY     # Auth Visits: no auth, 15 per POC            Subjective: Pt states he feels like he is walking a lot better than he has been. He reports the only thing that bothers him is stair descent.     Pain:   Beginnin/10 R knee  End: 0/10 R knee    Objective:  2024: [place]  Knee AROM: (* denotes performed with pain)   Evaluation    Flexion: R 120   Extension: R 0        Strength/MMT: (* denotes performed with pain)  Hip Knee   Flexion: R 5/5; L 5/5  Abduction: R 4+/5; L 5/5  ER: R 4+/5; L 5/5 Flexion: R 4+/5; L 5/5  Extension: R 5/5; L 5/5        Balance: SLS: R 13 sec, L 12 sec    At IE:  Knee AROM: (* denotes performed with pain)   Evaluation    Flexion: R 107; L 126   Extension: R -5; L 0        Strength/MMT: (* denotes performed with pain)  Hip Knee   Flexion: R 4/5; L 5/5  Extension: R NT/5; L NT/5  Abduction: R 4-/5; L 4+/5  ER: R 4/5; L 5/5 Flexion: R 4/5; L 5/5  Extension: R 4/5; L 5/5         Balance: SLS: R 12 sec, L 7 sec     Functional Mobility:  5x sit<>stand: 14s (5/10 pain), no UE  TUG (AD, time): 9.9 sec,  straight cane no UE for STS       Gait: pt ambulates on level ground with assistive device of straight cane, antalgia, and decreased hip/knee flex or ext RLE .   Stair Negotiation: step to pattern with ascent/descent, lateral stepping with 2UE on R railing. Ascent with LLE, descent with RLE      Assessment: Patient is progressing well. Pt and PT discussed discharge next visit, pt agrees he will continue with HEP at home to maintain progress and continue building strength. Patient demonstrates increased ability to participate in heel taps  after a few reps.     Goals: (To be met in 15 visits)   Pt will improve knee extension ROM to 0 deg to allow proper heel strike during gait and terminal knee extension in stance MET  Pt will improve knee AROM flexion to >120 degrees to improve ability to perform sit to stand transfer from low couch. MET  Pt will improve quad strength to 5/5 to ascend 1 flight of stairs reciprocally without UE assist MET  Pt will increase hip and knee strength to grossly 4+/5 to be able to get up and down from the floor safely progressing  Pt will demonstrate increased hip ER/ABD strength to 5/5 to perform stepping and squatting activities without excessive femoral IR/ADD progressing  Pt will improve SLS to >20s to improve safety and independence with gait on uneven surfaces such as grass progressing  Pt will be independent and compliant with comprehensive HEP to maintain progress achieved in PT Ongoing    LEFS Score  LEFS Score: 78.75 % (1/22/2024  1:59 PM)      Plan: Discharge next visit with HEP.    Date:1/31/2024                TX#: 3/10 Date: 2/1/2024                TX#: 4/10 Date: 2/6/2024            TX#: 5/10 Date: 2/9/2024   Tx#: 6/10 Date: 2/12/2024   Tx: 7/10 Date: 2/14/2024  Tx: 8/10 Date: 2/19/2024  Tx: 9/10  Date: 2/21/2024  Tx #: 10/10  SD 2/26/2024   Tx: 11/15 Date: 2/28/2024  Tx #: 12/15   MT: 15'  Gr III superior, inferior PF glide  Knee extension OP   Knee flexion OP MT: 15'  Gr III superior, inferior PF glide  Knee extension OP   Knee flexion OP MT: 15'  Gr III superior, inferior PF glide  Knee extension OP   Knee flexion OP MT: 12'  Scar mobilization  Gr III superior, inferior PF glide  Knee extension OP  MT: 15'  STM lateral/distal quadriceps  Gr III superior, inferior PF glide  Knee extension OP MT: 15'  STM lateral/distal quadriceps  Gr III superior, inferior PF glide  Knee extension OP MT: 10'  Gr III superior, inferior PF glide  Knee extension/flexion OP  Knee PROM - flex/extend MT: 10'  Gr III superior,  inferior PF glide  Knee extension/flexion OP  Knee PROM - flex/extend MT: 10'  STM, deep tendon massage at R patellar tendon   Gr III superior, inferior PF glide   MT: 10'  STM, deep tendon massage at R patellar tendon   Gr III superior, inferior PF glide   TE: 30'   Nustep, L4 UE/LE seat 11, 5'   Quad set to towel, x20, 5s hold  Straight Leg Raise with QS 3 sec x20   Bridging, x20  Standing heel/toe raise x20ea   TKE with RTB  3 sec hold x 20  TE: 30'   Nustep, L4 UE/LE seat 11, 5'   Quad set to towel, x15, 5s hold  Straight Leg Raise with QS 3 sec x20  Standing heel/toe raise x20ea   TKE with RTB  3 sec hold x 20   S/L hip abduction 2 x 10  Seated hamstring stretch R 5 x 15 sec TE: 30'   Quad set to towel, x15, 5s hold  Straight Leg Raise with QS 3 sec x 20  TKE with RTB  3 sec hold x 20   S/L hip abduction 2 x 10  FWD and LAT Step up's 4' R/L LE leading x 10 each  LAQ x 20 R  Hamstring curls GTB x 20  TE: 33'  Scifit, L2, 5' seat 11   Prone TKE (difficult) x25   Prone hamstring curl, AROM x30  Prone quadriceps stretch with strap 10s x7   HEP update  Side stepping RTB x10 step ea x3   Lateral 4 in step up, RLE 2x10   Attemping 4 in step up/over, held d/t pain    TE: 30'  Scifit, L2.5, 5' seat 9  Double Leg Press, 62# x40  Single Leg Press, 42#, RLE x30  SB hamstring curl x25  Calf stretch on wedge 3x30s   4 in tap down, RLE stance (fatiguing) 2x10 TE: 30'  Scifit, L2.5, 5' seat 9  Double Leg Press, 67# x40  Single Leg Press, 50#, RLE x30  SB hamstring curl x25  FWD step up 4' x 20   Eccentric step downs 2' 2 x 10 (very difficult) TE: 35'  Double Leg Press, 75# x 20 (slow lowering)   Single Leg Press, 50#, RLE x 20 (slow lowering)  SB hamstring curl x25  Eccentric step downs 2' 2 x 10 (moderate difficult) BUE support  Step up and over 4' x 20 (w/ UE support) Attempted without UE but caused a significant increase in pain  Lateral step up's 4 inch x 20   FWD step up's 6' x 20  Bridges add squeeze 3 sec x 20    TE: 30  min  Bridges adduction squeeze 3 sec x 20  Stair ambulation 4' (3 steps) x 5 (no difficulties)  Stair ambulation 6' (2 steps) x 6 (minimal difficulty with descent no difficulty with ascending)  Eccentric step downs FWD and LAT 4' x 20   SLS trials x 3 R/L  Step up and over 4' x 20 (NO UE support today)   Side steps YTB 5 ft x 6 TE: 32'  Scifit, L 2.5 5'   Straight leg raise with QS x15   SB bridging x20   6/12 in aden stepping forward x3 lap   6/12 in aden stepping lateral x3 lap   Standing TKE Blue TB x20, 5s hold   TRX squats, x30  4 in tap downs 2UE, x20  6 in step up/over 1UE (after manual) x10  TE: 35'  Scifit, L 2.5 5'   SB bridging x20   6/12 in aden stepping forward x3 lap   6/12 in aden stepping lateral x3 lap   TRX squats, x 30  4 in tap downs 2UE, x20 FWD and LAT  6 in step up/over 1UE x 15 no UE x 10                            HEP: Quad set, straight leg raise, bridging, hamstring stretch on stair, standing heel/toe raise  Access Code: [email or web address]  - Prone Terminal Knee Extension  - 1 x daily - 7 x weekly - 3 sets - 10 reps  - Prone Knee Flexion Extension AROM  - 1 x daily - 7 x weekly - 3 sets - 10 reps  - Prone Quadriceps Stretch with Strap  - 1 x daily - 7 x weekly - 10 sets - 10 seconds hold  - Side Stepping with Resistance at Ankles  - 1 x daily - 7 x weekly - 3 sets - 10 reps    Charges: 1 Manual, 2 TE       Total Timed Treatment: 45 min  Total Treatment Time: 45  min

## 2024-03-04 ENCOUNTER — OFFICE VISIT (OUTPATIENT)
Dept: PHYSICAL THERAPY | Facility: HOSPITAL | Age: 69
End: 2024-03-04
Payer: MEDICARE

## 2024-03-04 PROCEDURE — 97110 THERAPEUTIC EXERCISES: CPT

## 2024-03-04 NOTE — PROGRESS NOTES
Discharge Summary  Pt has attended 13 visits in Physical Therapy.     Diagnosis:   Orthopedic aftercare (Z47.89)  Primary osteoarthritis of right knee (M17.11)  S/P TKR (total knee replacement) using cement, right (Z96.651)      Referring Provider: Ayden  Date of Evaluation:    2024    Precautions:  None Next MD visit:   Not Scheduled  Date of Surgery: 2024   Insurance Primary/Secondary: MEDICARE / [employer] [place] INDEMNITY     # Auth Visits: no auth, 15 per POC            Subjective: He is walking a lot better but knows it's something that will continue to take time; currently walking 3-4 hours at a time but tired after. Is back to work, cannot do heavy lifting but has been doing more and more.     Pain:   Beginnin/10 R knee   With stair descent: 510   End: 0/10 R knee      Objective:    Knee AROM: (* denotes performed with pain)  3/4/2024   Evaluation   Flexion: R 120  Extension: R 0   Flexion: R 107; L 126   Extension: R -5; L 0        Strength/MMT: (* denotes performed with pain)  Hip Knee   Flexion: R 4/5; L 5/5  Extension: R NT/5; L NT/5  Abduction: R 4-/5; L 4+/5  ER: R 4/5; L 5/5 Flexion: R 4/5; L 5/5  Extension: R 4/5; L 5/5      3/4/2024     Flexion: R 5/5; L 5/5  Extension: R 4+/5; L 5/5  Abduction: R 4+/5; L 5/5  ER: R 5/5; L 5/5   3/4/2024     Flexion: R 5/5; L 5/5  Extension: R 5/5; L 5/5           Balance: SLS: R 12 sec, L 7 sec   3/4/2024: R 29 sec; L 17 sec     Functional Mobility:  5x sit<>stand: 14s (5/10 pain), no UE   3/4/2024: 11.5s no UE (0/10)    TUG (AD, time): 9.9 sec,  straight cane no UE for STS        3/4/2024: 7.3 sec, no AD, no UE for STS    Gait: pt ambulates on level ground with assistive device of straight cane, antalgia, and decreased hip/knee flex or ext RLE .    3/4/2024:   Stair Negotiation: step to pattern with ascent/descent, lateral stepping with 2UE on R railing. Ascent with LLE, descent with RLE   3/4/2024: Reciprocal ascent/descent, no railing       Assessment: Pt  has attended 13 visits in outpatient physical therapy s/p R TKA on 12/21/24. Since evaluation patient demonstrates significant improvement in AROM, hip and knee strength, and overall functional improvement with normalized gait and stair negotiation. At this time patient continues to have some difficulty with stair descent, but demonstrates great improvement and is able to ascend/descend reciprocally. Reiterating the importance of continued HEP compliance outside of therapy, pt is agreeable and reporting no questions at this time. Patient will be discharged home with continued walking and strengthening program,     Goals: (To be met in 15 visits)   Pt will improve knee extension ROM to 0 deg to allow proper heel strike during gait and terminal knee extension in stance MET  Pt will improve knee AROM flexion to >120 degrees to improve ability to perform sit to stand transfer from low couch. MET  Pt will improve quad strength to 5/5 to ascend 1 flight of stairs reciprocally without UE assist MET  Pt will increase hip and knee strength to grossly 4+/5 to be able to get up and down from the floor safely MET  Pt will demonstrate increased hip ER/ABD strength to 5/5 to perform stepping and squatting activities without excessive femoral IR/ADD MET   Pt will improve SLS to >20s to improve safety and independence with gait on uneven surfaces such as grass MET (on R)   Pt will be independent and compliant with comprehensive HEP to maintain progress achieved in PT Ongoing    LEFS Score  LEFS Score: 78.75 % (1/22/2024  1:59 PM)    Plan: D/C with continued HEP compliance.     Patient/Family/Caregiver was advised of these findings, precautions, and treatment options and has agreed to actively participate in planning and for this course of care.    Thank you for your referral. If you have any questions, please contact me at Dept: [phone].    Sincerely,  Electronically signed by therapist: Gaby Nash, PT     Physician's  certification required:  No  Please co-sign or sign and return this letter via fax as soon as possible to [phone].   I certify the need for these services furnished under this plan of treatment and while under my care.    X___________________________________________________ Date____________________    Certification From: 3/4/2024  To:6/2/2024   Date: 2/6/2024            TX#: 5/10 Date: 2/9/2024   Tx#: 6/10 Date: 2/12/2024   Tx: 7/10 Date: 2/14/2024  Tx: 8/10 Date: 2/19/2024  Tx: 9/10  Date: 2/21/2024  Tx #: 10/10  CO 2/26/2024   Tx: 11/15 Date: 2/28/2024  Tx #: 12/15 Date: 3/4/2024   Tx: 13/13    MT: 15'  Gr III superior, inferior PF glide  Knee extension OP   Knee flexion OP MT: 12'  Scar mobilization  Gr III superior, inferior PF glide  Knee extension OP  MT: 15'  STM lateral/distal quadriceps  Gr III superior, inferior PF glide  Knee extension OP MT: 15'  STM lateral/distal quadriceps  Gr III superior, inferior PF glide  Knee extension OP MT: 10'  Gr III superior, inferior PF glide  Knee extension/flexion OP  Knee PROM - flex/extend MT: 10'  Gr III superior, inferior PF glide  Knee extension/flexion OP  Knee PROM - flex/extend MT: 10'  STM, deep tendon massage at R patellar tendon   Gr III superior, inferior PF glide   MT: 10'  STM, deep tendon massage at R patellar tendon   Gr III superior, inferior PF glide    TE: 30'   Quad set to towel, x15, 5s hold  Straight Leg Raise with QS 3 sec x 20  TKE with RTB  3 sec hold x 20   S/L hip abduction 2 x 10  FWD and LAT Step up's 4' R/L LE leading x 10 each  LAQ x 20 R  Hamstring curls GTB x 20  TE: 33'  Scifit, L2, 5' seat 11   Prone TKE (difficult) x25   Prone hamstring curl, AROM x30  Prone quadriceps stretch with strap 10s x7   HEP update  Side stepping RTB x10 step ea x3   Lateral 4 in step up, RLE 2x10   Attemping 4 in step up/over, held d/t pain    TE: 30'  Scifit, L2.5, 5' seat 9  Double Leg Press, 62# x40  Single Leg Press, 42#, RLE x30  SB hamstring curl  x25  Calf stretch on wedge 3x30s   4 in tap down, RLE stance (fatiguing) 2x10 TE: 30'  Scifit, L2.5, 5' seat 9  Double Leg Press, 67# x40  Single Leg Press, 50#, RLE x30  SB hamstring curl x25  FWD step up 4' x 20   Eccentric step downs 2' 2 x 10 (very difficult) TE: 35'  Double Leg Press, 75# x 20 (slow lowering)   Single Leg Press, 50#, RLE x 20 (slow lowering)  SB hamstring curl x25  Eccentric step downs 2' 2 x 10 (moderate difficult) BUE support  Step up and over 4' x 20 (w/ UE support) Attempted without UE but caused a significant increase in pain  Lateral step up's 4 inch x 20   FWD step up's 6' x 20  Bridges add squeeze 3 sec x 20    TE: 30 min  Bridges adduction squeeze 3 sec x 20  Stair ambulation 4' (3 steps) x 5 (no difficulties)  Stair ambulation 6' (2 steps) x 6 (minimal difficulty with descent no difficulty with ascending)  Eccentric step downs FWD and LAT 4' x 20   SLS trials x 3 R/L  Step up and over 4' x 20 (NO UE support today)   Side steps YTB 5 ft x 6 TE: 32'  Scifit, L 2.5 5'   Straight leg raise with QS x15   SB bridging x20   6/12 in aden stepping forward x3 lap   6/12 in aden stepping lateral x3 lap   Standing TKE Blue TB x20, 5s hold   TRX squats, x30  4 in tap downs 2UE, x20  6 in step up/over 1UE (after manual) x10  TE: 35'  Scifit, L 2.5 5'   SB bridging x20   6/12 in aden stepping forward x3 lap   6/12 in aden stepping lateral x3 lap   TRX squats, x 30  4 in tap downs 2UE, x20 FWD and LAT  6 in step up/over 1UE x 15 no UE x 10  TE: 40'   Pt edu- D/C planning, HEP update and review  Reassessment  Full flight stair negotiation- x2   Side stepping, GTB x3 laps 10 step   Monster walk, GTB x3 lap forward/retro  6 in step up/over, 1UE x15 RLE  Jorje calf stretch, 3x30s                           HEP: Quad set, straight leg raise, bridging, hamstring stretch on stair, standing heel/toe raise  Access Code: [email or web address]  - Prone Terminal Knee Extension  - 1 x daily - 7 x weekly - 3 sets - 10  reps  - Prone Knee Flexion Extension AROM  - 1 x daily - 7 x weekly - 3 sets - 10 reps  - Prone Quadriceps Stretch with Strap  - 1 x daily - 7 x weekly - 10 sets - 10 seconds hold  - Side Stepping with Resistance at Ankles  - 1 x daily - 7 x weekly - 3 sets - 10 reps    Charges: 3 TE       Total Timed Treatment: 40 min  Total Treatment Time: 40  min

## 2024-09-24 DIAGNOSIS — N52.9 ERECTILE DYSFUNCTION, UNSPECIFIED ERECTILE DYSFUNCTION TYPE: ICD-10-CM

## 2024-09-28 RX ORDER — TADALAFIL 20 MG/1
20 TABLET ORAL AS NEEDED
Qty: 24 TABLET | Refills: 3 | Status: SHIPPED | OUTPATIENT
Start: 2024-09-28

## 2024-09-28 NOTE — TELEPHONE ENCOUNTER
Last office visit: 2/13/24   Protocol: pass    Requested medication(s) are due for refill today: Yes    Requested medication(s) are on the active medication list same strength, form, dose/ sig: Yes    Requested medication(s) are managed by provider: Yes    Patient has already received a courtsey refill: No    NOV: none  Asked to Return: n/a

## 2024-11-30 ENCOUNTER — HOSPITAL ENCOUNTER (EMERGENCY)
Facility: HOSPITAL | Age: 69
Discharge: HOME | End: 2024-11-30
Attending: STUDENT IN AN ORGANIZED HEALTH CARE EDUCATION/TRAINING PROGRAM
Payer: MEDICARE

## 2024-11-30 VITALS
WEIGHT: 179 LBS | RESPIRATION RATE: 20 BRPM | SYSTOLIC BLOOD PRESSURE: 156 MMHG | BODY MASS INDEX: 27 KG/M2 | DIASTOLIC BLOOD PRESSURE: 85 MMHG | HEART RATE: 72 BPM | TEMPERATURE: 97 F | OXYGEN SATURATION: 98 %

## 2024-11-30 DIAGNOSIS — S61.412A COMPLICATED LACERATION OF HAND, LEFT, INITIAL ENCOUNTER: Primary | ICD-10-CM

## 2024-11-30 PROCEDURE — 99283 EMERGENCY DEPT VISIT LOW MDM: CPT

## 2024-11-30 PROCEDURE — 12042 INTMD RPR N-HF/GENIT2.6-7.5: CPT

## 2024-11-30 RX ORDER — HYDROCODONE BITARTRATE AND ACETAMINOPHEN 5; 325 MG/1; MG/1
1-2 TABLET ORAL EVERY 6 HOURS PRN
Qty: 10 TABLET | Refills: 0 | Status: SHIPPED | OUTPATIENT
Start: 2024-11-30 | End: 2024-12-05

## 2024-11-30 RX ORDER — CEPHALEXIN 500 MG/1
500 CAPSULE ORAL 4 TIMES DAILY
Qty: 28 CAPSULE | Refills: 0 | Status: SHIPPED | OUTPATIENT
Start: 2024-11-30 | End: 2024-12-07

## 2024-12-01 NOTE — ED PROVIDER NOTES
Patient Seen in: [facility] [place] Emergency Department      History     Chief Complaint   Patient presents with    Laceration/Abrasion     Stated Complaint: Laceration    Subjective:   HPI      69-year-old male left-hand-dominant presenting for a laceration to left hand.  Just prior to arrival he was trying to cut wood at home with electric saw and the saw slipped and he sustained a laceration in first interdigital space.  He denies numbness weakness or tingling.  He states tetanus updated this year.  Denies blood thinner use    Objective:     Past Medical History:    Anxiety state    Colon polyps    repeat CLN in     High cholesterol    Hypercholesteremia    Osteoarthritis    PONV (postoperative nausea and vomiting)    Prediabetes              Past Surgical History:   Procedure Laterality Date    Appendectomy      Colonoscopy N/A 2023    Procedure: COLONOSCOPY;  Surgeon: LONG Yu MD;  Location: [facility] ENDOSCOPY    Hernia surgery      Other surgical history      meniscal tear    Other surgical history      ulcer                Social History     Socioeconomic History    Marital status:    Tobacco Use    Smoking status: Former     Current packs/day: 0.00     Types: Cigarettes     Quit date: 2003     Years since quittin.9     Passive exposure: Never    Smokeless tobacco: Never   Vaping Use    Vaping status: Never Used   Substance and Sexual Activity    Alcohol use: Yes     Comment: socially    Drug use: Never   Other Topics Concern    Caffeine Concern Yes     Comment: 1 cup daily coffee 1 can coke daily    Exercise Yes                  Physical Exam     ED Triage Vitals [24 1552]   /85   Pulse 72   Resp 20   Temp 97.3 °F (36.3 °C)   Temp src Oral   SpO2 98 %   O2 Device None (Room air)       Current Vitals:   Vital Signs  BP: 156/85  Pulse: 72  Resp: 20  Temp: 97.3 °F (36.3 °C)  Temp src: Oral    Oxygen Therapy  SpO2: 98 %  O2 Device: None (Room  air)        Physical Exam  Constitutional: awake, alert, no sig distress  HENT: mmm, no lesions,  Neck: normal range of motion, no tenderness, supple.  Eyes: PERRL, EOMI, conjunctiva normal, no discharge. Sclera anicteric.  Cardiovascular: rr no murmur  Respiratory: Normal breath sounds, no respiratory distress, no wheezing, no chest tenderness.  GI: Bowel sounds normal, Soft, no tenderness, no masses, no pulsatile masses.  : No CVA tenderness.  Skin: Warm, dry, no erythema, no rash.  Left hand exam: 5 cm laceration dorsum of left hand and left first interdigital space.  Actively bleeding from the proximal aspect of the wound - no arterial hemorrhage.  After hemostasis was obtained wound was inspected and full range of motion and found to be without foreign body or obvious extensor tendon injury.  Sensation is intact.  Patient has full range of motion of all digits.  Intrinsic muscles of the hand intact.  Distal fingers are warm and well-perfused  Neurologic: Alert & oriented x 3, normal motor function, normal sensory function, no focal deficits noted.  Psych: Calm, cooperative, nl affect    ED Course   Labs Reviewed - No data to display    ED Course as of 11/30/24 2157  ------------------------------------------------------------  Time: 11/30 1813  Comment: Observed for period of time wound is hemostatic there is no hematoma compartments are soft and patient remains neurovascularly intact with soft easily compressible compartments  ------------------------------------------------------------  Time: 11/30 1814  Comment: Return precautions and follow-up instructions were discussed with patient who voiced understanding and agreement the plan.  All questions were answered to patient satisfaction.              MDM      69-year-old male presenting with laceration left hand and first interdigital space on arrival vitals are stable and reassuring.  On exam there is active significant bleeding from proximal aspect of the  wound which is packed with surgicel snow.   On reevaluation hemostasis is improved -and on reevaluation there is no evidence of extensor tendon injury.  Patient is neurovascular intact.    Laceration repair:    Verbal consent was obtained from the patient.  The 5 cm laceration located left hand 1st interdigital space was anesthetized in the usual fashion. The wound was scrubbed, draped and explored.   There were no deep structures involved.  No tendon injury was identified.  The wound was repaired with 5-0 vicryl subcutaneous layer and 5-0 nylon .  The wound repair was of intermediate complexity given 2-layer repair and significant bleeding.  The procedure was performed by myself.    Discussed return precautions and follow up instructions with patient who voiced understanding and agreement with the plan. All questions answered.         MDM    Disposition and Plan     Clinical Impression:  1. Complicated laceration of hand, left, initial encounter         Disposition:  Discharge  11/30/2024  6:16 pm    Follow-up:  [facility] Emergency Department  [address] [address] [phone]  Follow up in 10 day(s)  As needed, If symptoms worsen, For suture removal, For wound re-check    Richar Valadez MD  [address]  [facility] [address] [phone]    Call      We recommend that you schedule follow up care with a primary care provider within the next three months to obtain basic health screening including reassessment of your blood pressure.      Medications Prescribed:  Discharge Medication List as of 11/30/2024  6:21 PM        START taking these medications    Details   cephALEXin 500 MG Oral Cap Take 1 capsule (500 mg total) by mouth 4 (four) times daily for 7 days., Normal, Disp-28 capsule, R-0      !! HYDROcodone-acetaminophen 5-325 MG Oral Tab Take 1-2 tablets by mouth every 6 (six) hours as needed for Pain., Normal, Disp-10 tablet, R-0       !! - Potential duplicate  medications found. Please discuss with provider.              Supplementary Documentation:

## 2024-12-01 NOTE — DISCHARGE INSTRUCTIONS
Please return to the emergency department or to your primary care doctor in 7-10 days to have  your sutures removed. Return to the emergency department earlier if you develop fevers, if you  see pus coming from the wound, or if you develop redness around the wound that extends  beyond 1 inch from the wound edges as these can be signs of wound infection.    Regrese al departamento de emergencias o a galvin médico de atención primaria en 7 a 10 días para recibir  te quitaron las suturas. Regrese al departamento de emergencias antes si presenta fiebre, si  ve pus saliendo de la herida, o si desarrolla enrojecimiento alrededor de la herida que se extiende  más allá de 1 pulgada de los bordes de la herida, ya que estos pueden ser signos de infección de la herida.

## 2024-12-23 DIAGNOSIS — K21.9 GASTROESOPHAGEAL REFLUX DISEASE WITHOUT ESOPHAGITIS: ICD-10-CM

## 2024-12-23 NOTE — TELEPHONE ENCOUNTER
Pharmacy requesting refill      pantoprazole 40 MG Oral Tab EC, Take 1 tablet (40 mg total) by mouth every morning before breakfast., Disp: 90 tablet, Rfl: 3

## 2024-12-27 RX ORDER — PANTOPRAZOLE SODIUM 40 MG/1
40 TABLET, DELAYED RELEASE ORAL
Qty: 90 TABLET | Refills: 3 | Status: SHIPPED | OUTPATIENT
Start: 2024-12-27

## 2024-12-27 NOTE — TELEPHONE ENCOUNTER
Refill passed per [facility] protocol.  Requested Prescriptions   Pending Prescriptions Disp Refills    pantoprazole 40 MG Oral Tab EC 90 tablet 3     Sig: Take 1 tablet (40 mg total) by mouth every morning before breakfast.       Gastrointestional Medication Protocol Passed - 12/27/2024  3:48 PM        Passed - In person appointment or virtual visit in the past 12 mos or appointment in next 3 mos     Recent Outpatient Visits              9 months ago     [facility] Gaby Nash, PT    Office Visit    10 months ago     [facility] Joon Garcia, PT    Office Visit    10 months ago     [facility] Gaby Nash, PT    Office Visit    10 months ago     [facility] Joon Garcia, PT    Office Visit    10 months ago     [facility] Joon Garcia, PT    Office Visit          Future Appointments         Provider Department Appt Notes    In 1 month Sharron Stevens MD [facility]   last px 2/13/24                       Future Appointments         Provider Department Appt Notes    In 1 month Sharron Stevens MD [facility]   last px 2/13/24          Recent Outpatient Visits              9 months ago     [facility] Gaby Nash, PT    Office Visit    10 months ago     [facility] Joon Garcia, PT    Office Visit    10 months ago     [facility] Gaby Nash, PT    Office Visit    10 months ago     [facility] Joon Garcia, PT    Office Visit    10 months ago     [facility] Joon Garcia, PT    Office Visit

## 2025-02-26 NOTE — TELEPHONE ENCOUNTER
Patient calling ( name and date of birth of patient verified ) needs  refill of Tamsulosin     Pharmacy confirmed     Medication pended to run thru Protocol

## 2025-03-03 RX ORDER — TAMSULOSIN HYDROCHLORIDE 0.4 MG/1
0.8 CAPSULE ORAL DAILY
Qty: 180 CAPSULE | Refills: 3 | Status: SHIPPED | OUTPATIENT
Start: 2025-03-03

## 2025-03-03 NOTE — TELEPHONE ENCOUNTER
Refill passed [facility] protocol.     Requested Prescriptions     Pending Prescriptions Disp Refills    tamsulosin 0.4 MG Oral Cap 180 capsule 3     Sig: Take 2 capsules (0.8 mg total) by mouth daily.

## 2025-03-31 ENCOUNTER — TELEPHONE (OUTPATIENT)
Dept: FAMILY MEDICINE CLINIC | Facility: CLINIC | Age: 70
End: 2025-03-31

## 2025-03-31 DIAGNOSIS — Z00.00 ENCOUNTER FOR ANNUAL HEALTH EXAMINATION: Primary | ICD-10-CM

## 2025-03-31 DIAGNOSIS — E78.00 HYPERCHOLESTEREMIA: ICD-10-CM

## 2025-03-31 DIAGNOSIS — Z12.5 PROSTATE CANCER SCREENING: ICD-10-CM

## 2025-03-31 NOTE — TELEPHONE ENCOUNTER
Patient scheduled for annual physical 4/7/25.  Orders pended, please review and advise if appropriate.

## 2025-04-01 NOTE — TELEPHONE ENCOUNTER
1. Encounter for annual health examination    - CBC With Differential With Platelet  - Comp Metabolic Panel (14)  - Lipid Panel  - TSH W Reflex To Free T4  - PSA Total, Screen [Male over 50]  - Hemoglobin A1C [E]    2. Prostate cancer screening    - PSA Total, Screen [Male over 50]    3. Hypercholesteremia    - Comp Metabolic Panel (14)  - Lipid Panel

## 2025-04-02 ENCOUNTER — LAB ENCOUNTER (OUTPATIENT)
Dept: LAB | Age: 70
End: 2025-04-02
Attending: FAMILY MEDICINE
Payer: MEDICARE

## 2025-04-02 LAB
ALBUMIN SERPL-MCNC: 4.3 G/DL (ref 3.2–4.8)
ALBUMIN/GLOB SERPL: 1.5 {RATIO} (ref 1–2)
ALP LIVER SERPL-CCNC: 72 U/L
ALT SERPL-CCNC: 34 U/L
ANION GAP SERPL CALC-SCNC: 7 MMOL/L (ref 0–18)
AST SERPL-CCNC: 30 U/L (ref ?–34)
BASOPHILS # BLD AUTO: 0.05 X10(3) UL (ref 0–0.2)
BASOPHILS NFR BLD AUTO: 1 %
BILIRUB SERPL-MCNC: 0.8 MG/DL (ref 0.2–1.1)
BUN BLD-MCNC: 15 MG/DL (ref 9–23)
BUN/CREAT SERPL: 15.6 (ref 10–20)
CALCIUM BLD-MCNC: 9.2 MG/DL (ref 8.7–10.4)
CHLORIDE SERPL-SCNC: 103 MMOL/L (ref 98–112)
CHOLEST SERPL-MCNC: 161 MG/DL (ref ?–200)
CO2 SERPL-SCNC: 28 MMOL/L (ref 21–32)
COMPLEXED PSA SERPL-MCNC: 1.64 NG/ML (ref ?–4)
CREAT BLD-MCNC: 0.96 MG/DL
DEPRECATED RDW RBC AUTO: 44.6 FL (ref 35.1–46.3)
EGFRCR SERPLBLD CKD-EPI 2021: 86 ML/MIN/1.73M2 (ref 60–?)
EOSINOPHIL # BLD AUTO: 0.07 X10(3) UL (ref 0–0.7)
EOSINOPHIL NFR BLD AUTO: 1.4 %
ERYTHROCYTE [DISTWIDTH] IN BLOOD BY AUTOMATED COUNT: 13.2 % (ref 11–15)
EST. AVERAGE GLUCOSE BLD GHB EST-MCNC: 111 MG/DL (ref 68–126)
FASTING PATIENT LIPID ANSWER: YES
FASTING STATUS PATIENT QL REPORTED: YES
GLOBULIN PLAS-MCNC: 2.9 G/DL (ref 2–3.5)
GLUCOSE BLD-MCNC: 95 MG/DL (ref 70–99)
HBA1C MFR BLD: 5.5 % (ref ?–5.7)
HCT VFR BLD AUTO: 44 %
HDLC SERPL-MCNC: 35 MG/DL (ref 40–59)
HGB BLD-MCNC: 15.4 G/DL
IMM GRANULOCYTES # BLD AUTO: 0.01 X10(3) UL (ref 0–1)
IMM GRANULOCYTES NFR BLD: 0.2 %
LDLC SERPL CALC-MCNC: 109 MG/DL (ref ?–100)
LYMPHOCYTES # BLD AUTO: 1.88 X10(3) UL (ref 1–4)
LYMPHOCYTES NFR BLD AUTO: 36.3 %
MCH RBC QN AUTO: 32.1 PG (ref 26–34)
MCHC RBC AUTO-ENTMCNC: 35 G/DL (ref 31–37)
MCV RBC AUTO: 91.7 FL
MONOCYTES # BLD AUTO: 0.55 X10(3) UL (ref 0.1–1)
MONOCYTES NFR BLD AUTO: 10.6 %
NEUTROPHILS # BLD AUTO: 2.62 X10 (3) UL (ref 1.5–7.7)
NEUTROPHILS # BLD AUTO: 2.62 X10(3) UL (ref 1.5–7.7)
NEUTROPHILS NFR BLD AUTO: 50.5 %
NONHDLC SERPL-MCNC: 126 MG/DL (ref ?–130)
OSMOLALITY SERPL CALC.SUM OF ELEC: 287 MOSM/KG (ref 275–295)
PLATELET # BLD AUTO: 168 10(3)UL (ref 150–450)
POTASSIUM SERPL-SCNC: 4.3 MMOL/L (ref 3.5–5.1)
PROT SERPL-MCNC: 7.2 G/DL (ref 5.7–8.2)
RBC # BLD AUTO: 4.8 X10(6)UL
SODIUM SERPL-SCNC: 138 MMOL/L (ref 136–145)
TRIGL SERPL-MCNC: 88 MG/DL (ref 30–149)
TSI SER-ACNC: 1.95 UIU/ML (ref 0.55–4.78)
VLDLC SERPL CALC-MCNC: 15 MG/DL (ref 0–30)
WBC # BLD AUTO: 5.2 X10(3) UL (ref 4–11)

## 2025-04-02 PROCEDURE — 85025 COMPLETE CBC W/AUTO DIFF WBC: CPT | Performed by: FAMILY MEDICINE

## 2025-04-02 PROCEDURE — 36415 COLL VENOUS BLD VENIPUNCTURE: CPT | Performed by: FAMILY MEDICINE

## 2025-04-02 PROCEDURE — 80053 COMPREHEN METABOLIC PANEL: CPT | Performed by: FAMILY MEDICINE

## 2025-04-02 PROCEDURE — 80061 LIPID PANEL: CPT | Performed by: FAMILY MEDICINE

## 2025-04-02 PROCEDURE — 83036 HEMOGLOBIN GLYCOSYLATED A1C: CPT | Performed by: FAMILY MEDICINE

## 2025-04-02 PROCEDURE — 84443 ASSAY THYROID STIM HORMONE: CPT | Performed by: FAMILY MEDICINE

## 2025-04-07 ENCOUNTER — OFFICE VISIT (OUTPATIENT)
Dept: FAMILY MEDICINE CLINIC | Facility: CLINIC | Age: 70
End: 2025-04-07

## 2025-04-07 VITALS
BODY MASS INDEX: 28.64 KG/M2 | DIASTOLIC BLOOD PRESSURE: 78 MMHG | HEART RATE: 88 BPM | HEIGHT: 68 IN | WEIGHT: 189 LBS | SYSTOLIC BLOOD PRESSURE: 125 MMHG

## 2025-04-07 DIAGNOSIS — K64.8 INTERNAL HEMORRHOIDS: ICD-10-CM

## 2025-04-07 DIAGNOSIS — D69.6 THROMBOCYTOPENIA: ICD-10-CM

## 2025-04-07 DIAGNOSIS — E55.9 VITAMIN D DEFICIENCY: ICD-10-CM

## 2025-04-07 DIAGNOSIS — E78.00 HYPERCHOLESTEREMIA: ICD-10-CM

## 2025-04-07 DIAGNOSIS — N13.8 BPH WITH OBSTRUCTION/LOWER URINARY TRACT SYMPTOMS: ICD-10-CM

## 2025-04-07 DIAGNOSIS — Z00.00 ENCOUNTER FOR ANNUAL HEALTH EXAMINATION: Primary | ICD-10-CM

## 2025-04-07 DIAGNOSIS — K63.5 POLYP OF COLON, UNSPECIFIED PART OF COLON, UNSPECIFIED TYPE: ICD-10-CM

## 2025-04-07 DIAGNOSIS — N52.9 ERECTILE DYSFUNCTION, UNSPECIFIED ERECTILE DYSFUNCTION TYPE: ICD-10-CM

## 2025-04-07 DIAGNOSIS — E83.51 HYPOCALCEMIA: ICD-10-CM

## 2025-04-07 DIAGNOSIS — K21.9 GASTROESOPHAGEAL REFLUX DISEASE WITHOUT ESOPHAGITIS: ICD-10-CM

## 2025-04-07 DIAGNOSIS — N40.1 BPH WITH OBSTRUCTION/LOWER URINARY TRACT SYMPTOMS: ICD-10-CM

## 2025-04-07 DIAGNOSIS — Z96.651 PRESENCE OF RIGHT ARTIFICIAL KNEE JOINT: ICD-10-CM

## 2025-04-07 DIAGNOSIS — M17.11 PRIMARY OSTEOARTHRITIS OF RIGHT KNEE: ICD-10-CM

## 2025-04-07 NOTE — PROGRESS NOTES
Subjective:   Ashutosh Pike is a 69 year old male who presents for a Medicare Wellness Visit charge within the last 11 months and Patient may not meet criteria for AWV: Please evaluate for correct coding and scheduled follow up of multiple significant but stable problems.     Stopped working and less active and has gained at 10 lbs.   S/p R tKA doing well, needs L knee too.       History/Other:   Fall Risk Assessment:   He has been screened for Falls and is low risk.      Cognitive Assessment:   He had a completely normal cognitive assessment - see flowsheet entries     Functional Ability/Status:   Ashutosh Pike has some abnormal functions as listed below:  He has problems with Memory based on screening of functional status.       Depression Screening (PHQ):  PHQ-2 SCORE: 0  , done 4/7/2025             Advanced Directives:   He does NOT have a Living Will. [Do you have a living will?: No]  He does NOT have a Power of  for Health Care. [Do you have a healthcare power of ?: No]  Discussed Advance Care Planning with patient (and family/surrogate if present). Standard forms made available to patient in After Visit Summary.      Patient Active Problem List   Diagnosis    Hypercholesteremia    Vitamin D deficiency    Hypocalcemia    BPH with obstruction/lower urinary tract symptoms    Thrombocytopenia    Erectile dysfunction    Gastroesophageal reflux disease without esophagitis    Internal hemorrhoids    Polyp of colon    Primary osteoarthritis of right knee    Presence of right artificial knee joint     Allergies:  He has No Known Allergies.    Current Medications:  Outpatient Medications Marked as Taking for the 4/7/25 encounter (Office Visit) with Sharron Stevens MD   Medication Sig    tamsulosin 0.4 MG Oral Cap Take 2 capsules (0.8 mg total) by mouth daily.    pantoprazole 40 MG Oral Tab EC Take 1 tablet (40 mg total) by mouth every morning before breakfast.    Multiple Vitamin  (MULTIVITAMIN) Oral Tab Take 1 tablet by mouth daily.    B Complex Vitamins (B COMPLEX OR) Take by mouth.    ergocalciferol 1.25 MG (10794 UT) Oral Cap Take by mouth every 7 days.       Medical History:  He  has a past medical history of Anxiety state, Colon polyps (), High cholesterol, Hypercholesteremia (2022), Osteoarthritis, PONV (postoperative nausea and vomiting), and Prediabetes.  Surgical History:  He  has a past surgical history that includes other surgical history (); other surgical history (); appendectomy (); hernia surgery (); and colonoscopy (N/A, 2023).   Family History:  His family history is not on file.  Social History:  He  reports that he quit smoking about 22 years ago. His smoking use included cigarettes. He has never been exposed to tobacco smoke. He has never used smokeless tobacco. He reports current alcohol use. He reports that he does not use drugs.    Tobacco:  He smoked tobacco in the past but quit greater than 12 months ago.  Social History     Tobacco Use   Smoking Status Former    Current packs/day: 0.00    Types: Cigarettes    Quit date: 2003    Years since quittin.2    Passive exposure: Never   Smokeless Tobacco Never        CAGE Alcohol Screen:   CAGE screening score of 0 on 2025, showing low risk of alcohol abuse.      Patient Care Team:  Sharron Stevens MD as PCP - General (Family Medicine)  Gaby Nash PT as Physical Therapist (Physical Therapy)  Joon Garcia PT as Physical Therapist (Physical Therapy)    Review of Systems     Negative except per hpi     Objective:   Physical Exam  General appearance: alert  HEENT: Normocephalic, without obvious abnormality, atraumatic. PERRL, EOMI, pink conjunctiva.   Neck: supple, symmetrical, trachea midline, no adenopathy, no JVD and thyroid not enlarged,  Lungs: respirations unlabored, clear to auscultation bilaterally  Heart: regular rate and rhythm, S1, S2 normal, no murmur  Abdomen:  normoactive bowel sounds x4; soft, non-distended; nontender; no masses  Extremities: extremities normal, atraumatic, no cyanosis or edema; no erythema or tenderness in calves bilaterally  Neurologic: alert, oriented x3    /78   Pulse 88   Ht 5' 8\" (1.727 m)   Wt 189 lb (85.7 kg)   BMI 28.74 kg/m²  Estimated body mass index is 28.74 kg/m² as calculated from the following:    Height as of this encounter: 5' 8\" (1.727 m).    Weight as of this encounter: 189 lb (85.7 kg).    Medicare Hearing Assessment:   Hearing Screening    Time taken: 4/7/2025  1:59 PM  Screening Method: Questionnaire  I have a problem hearing over the telephone: No I have trouble following the conversations when two or more people are talking at the same time: No   I have trouble understanding things on the TV: No I have to strain to understand conversations: No   I have to worry about missing the telephone ring or doorbell: No I have trouble hearing conversations in a noisy background such as a crowded room or restaurant: No   I get confused about where sounds come from: No I misunderstand some words in a sentence and need to ask people to repeat themselves: Yes   I especially have trouble understanding the speech of women and children: No I have trouble understanding the speaker in a large room such as at a meeting or place of [employer]od[employer]: No   Many people I talk to seem to mumble (or don't speak clearly): Yes People get annoyed because I misunderstand what they say: No   I misunderstand what others are saying and make inappropriate responses: No I avoid social activities because I cannot hear well and fear I will reply improperly: No   Family members and friends have told me they think I may have hearing loss: No               Vision testing not required for subsequent Medicare annual exam    Assessment & Plan:   Ashutosh Pike is a 69 year old male who presents for a Medicare Assessment.     \  1. Encounter for annual health  examination  -Medical, surgical and social history, as well as medications and allergies were reviewed with patient.  Labs reviewed    2. Thrombocytopenia  - Stable condition, continue present management.        3. Hypercholesteremia  - Stable condition, continue present management.      4. Vitamin D deficiency  - Stable condition, continue present management.      5. Gastroesophageal reflux disease without esophagitis  - Stable condition, continue present management.      6. Internal hemorrhoids  - Stable condition, continue present management.      7. Polyp of colon, unspecified part of colon, unspecified type  - Stable condition, continue present management.      8. Presence of right artificial knee joint  - Stable condition, continue present management.      9. Primary osteoarthritis of right knee  - Stable condition, continue present management.      10. BPH with obstruction/lower urinary tract symptoms  - Stable condition, continue present management.      11. Erectile dysfunction, unspecified erectile dysfunction type  - Stable condition, continue present management.      12. Hypocalcemia  - Stable condition, continue present management.      The patient indicates understanding of these issues and agrees to the plan.  Reinforced healthy diet, lifestyle, and exercise.      No follow-ups on file.     Sharron Stevens MD, 4/7/2025     Supplementary Documentation:   General Health:  In the past six months, have you lost more than 10 pounds without trying?: 2 - No  Has your appetite been poor?: No  Type of Diet: Other  How does the patient maintain a good energy level?: Other  How would you describe your daily physical activity?: Moderate  How would you describe your current health state?: Fair  How do you maintain positive mental well-being?: Visiting Family  On a scale of 0 to 10, with 0 being no pain and 10 being severe pain, what is your pain level?: 1 - (Mild)  In the past six months, have you experienced  urine leakage?: 0-No  At any time do you feel concerned for the safety/well-being of yourself and/or your children, in your home or elsewhere?: No  Have you had any immunizations at another office such as Influenza, Hepatitis B, Tetanus, or Pneumococcal?: No    Health Maintenance   Topic Date Due    Zoster Vaccines (1 of 2) Never done    Annual Depression Screening  01/01/2025    Fall Risk Screening (Annual)  01/01/2025    Annual Physical  02/13/2025    COVID-19 Vaccine (5 - 2024-25 season) 04/05/2025    Influenza Vaccine (Season Ended) 10/01/2025    Colorectal Cancer Screening  05/09/2026    PSA  04/02/2027    Pneumococcal Vaccine: 50+ Years  Completed    Meningococcal B Vaccine  Aged Out

## (undated) DIAGNOSIS — K21.00 GASTROESOPHAGEAL REFLUX DISEASE WITH ESOPHAGITIS, UNSPECIFIED WHETHER HEMORRHAGE: ICD-10-CM

## (undated) DEVICE — GAMMEX® NON-LATEX PI ORTHO SIZE 7, STERILE POLYISOPRENE POWDER-FREE SURGICAL GLOVE: Brand: GAMMEX

## (undated) DEVICE — VIOLET BRAIDED (POLYGLACTIN 910), SYNTHETIC ABSORBABLE SUTURE: Brand: COATED VICRYL

## (undated) DEVICE — Device: Brand: STABLECUT®

## (undated) DEVICE — SNARE CAPTIFLEX MICRO-OVL OLY

## (undated) DEVICE — GAMMEX® PI HYBRID SIZE 7.5, STERILE POWDER-FREE SURGICAL GLOVE, POLYISOPRENE AND NEOPRENE BLEND: Brand: GAMMEX

## (undated) DEVICE — DRESSING SILVERLON ISLAND 11IN

## (undated) DEVICE — ADHESIVE SKIN TOP FOR WND CLSR DERMBND ADV

## (undated) DEVICE — HOOD: Brand: FLYTE

## (undated) DEVICE — 35 ML SYRINGE LUER-LOCK TIP: Brand: MONOJECT

## (undated) DEVICE — GUIDEPIN SUR L29MM FEM TIB PREFERRED PT SPEC

## (undated) DEVICE — BANDAGE COHESIVE W4INXL5YD TAN E POR SLF ADH

## (undated) DEVICE — PROXIMATE SKIN STAPLERS (35 WIDE) CONTAINS 35 STAINLESS STEEL STAPLES (FIXED HEAD): Brand: PROXIMATE

## (undated) DEVICE — GAMMEX® NON-LATEX PI ORTHO SIZE 8, STERILE POLYISOPRENE POWDER-FREE SURGICAL GLOVE: Brand: GAMMEX

## (undated) DEVICE — CEMENT MIXING SYSTEM WITH FEMORAL BREAKWAY NOZZLE: Brand: REVOLUTION

## (undated) DEVICE — GAMMEX® PI HYBRID SIZE 6.5, STERILE POWDER-FREE SURGICAL GLOVE, POLYISOPRENE AND NEOPRENE BLEND: Brand: GAMMEX

## (undated) DEVICE — DRAPE SHEET LG

## (undated) DEVICE — 60 ML SYRINGE REGULAR TIP: Brand: MONOJECT

## (undated) DEVICE — SUTURE MCRYL SZ 3-0 L27IN ABSRB UD L24MM PS-1

## (undated) DEVICE — SUTURE VCRL SZ 2-0 L27IN ABSRB UD L24MM FS-1

## (undated) DEVICE — NEEDLE SPINL CLR HUB 18GX3 1/2

## (undated) DEVICE — APPLICATOR SKIN PREP 26ML HI LT ORNG 2% CHG

## (undated) DEVICE — Device: Brand: DUAL NARE NASAL CANNULAE FEMALE LUER CON 7FT O2 TUBE

## (undated) DEVICE — DISPOSABLE TOURNIQUET CUFF SINGLE BLADDER, DUAL PORT AND QUICK CONNECT CONNECTOR: Brand: COLOR CUFF

## (undated) DEVICE — SCREW BNE L25MM DIA2.5MM KNEE FT HEX FEM

## (undated) DEVICE — TOTAL KNEE: Brand: MEDLINE INDUSTRIES, INC.

## (undated) DEVICE — MEDI-VAC NON-CONDUCTIVE SUCTION TUBING 6MM X 1.8M (6FT.) L: Brand: CARDINAL HEALTH

## (undated) DEVICE — SPONGE GZ 4XL4IN 100% COT 12 PLY TYP VII WVN

## (undated) DEVICE — KIT CLEAN ENDOKIT 1.1OZ GOWNX2

## (undated) DEVICE — SNARE OPTMZ PLPCTM TRP

## (undated) DEVICE — SOLUTION IRRIG 3000ML 0.9% NACL FLX CONT

## (undated) DEVICE — SOLUTION IRRIG 1000ML 0.9% NACL USP BTL

## (undated) DEVICE — PIN DRL L75MM DIA3.2MM HEX 2.5MM TRCR TIP

## (undated) DEVICE — SCREW BNE L48MM DIA3.5MM STD HD 5MM CORT ST

## (undated) DEVICE — WRAP COOLING KNEE W/ICE PILLOW

## (undated) DEVICE — KIT ENDO ORCAPOD 160/180/190

## (undated) NOTE — LETTER
AUTHORIZATION FOR SURGICAL OPERATION OR OTHER PROCEDURE    1. I hereby authorize Dr. Blair Espinal and the [facility] staff assigned to my case to perform the following operation and/or procedure at the [facility]:    BILATERAL knee CSI under ultrasound guidance    2. My physician has explained the nature and purpose of the operation or other procedure, possible alternative methods of treatment, the risks involved, and the possibility of complication to me. I acknowledge that no guarantee has been made as to the result that may be obtained. 3.  I recognize that, during the course of this operation, or other procedure, unforseen conditions may necessitate additional or different procedure than those listed above. I, therefore, further authorize and request that the above named physician, his/her physician assistants or designees perform such procedures as are, in his/her professional opinion, necessary and desirable. 4.  Any tissue or organs removed in the operation or other procedure may be disposed of by and at the discretion of the [facility] staff and [facility]. 5.  I understand that in the event of a medical emergency, I will be transported by local paramedics to [facility] or other hospital emergency department. 6.  I certify that I have read and fully understand the above consent to operation and/or other procedure. 7.  I acknowledge that my physician has explained sedation/analgesia administration to me including the risks and benefits. I consent to the administration of sedation/analgesia as may be necessary or desirable in the judgement of my physician. Witness signature: ___________________________________________________ Date:  ______/______/_____                    Time:  ________ A. M.  P.M.        Patient Name:  Christopher Morgan  4/28/1955  [id number]       Patient signature:  ___________________________________________________                   Statement of Physician  My signature below affirms that prior to the time of the procedure, I have explained to the patient and/or his/her guardian, the risks and benefits involved in the proposed treatment and any reasonable alternative to the proposed treatment. I have also explained the risks and benefits involved in the refusal of the proposed treatment and have answered the patient's questions.                         Date:  ______/______/_______  Provider                      Signature:  __________________________________________________________       Time:  ___________ A.M    P.M.

## (undated) NOTE — LETTER
AUTHORIZATION FOR SURGICAL OPERATION OR OTHER PROCEDURE    1. I hereby authorize Dr. Gloria Koo and the [facility] staff assigned to my case to perform the following operation and/or procedure at the [facility]:    RIGHT knee Durolane and CSI under ultrasound guidance    2. My physician has explained the nature and purpose of the operation or other procedure, possible alternative methods of treatment, the risks involved, and the possibility of complication to me. I acknowledge that no guarantee has been made as to the result that may be obtained. 3.  I recognize that, during the course of this operation, or other procedure, unforseen conditions may necessitate additional or different procedure than those listed above. I, therefore, further authorize and request that the above named physician, his/her physician assistants or designees perform such procedures as are, in his/her professional opinion, necessary and desirable. 4.  Any tissue or organs removed in the operation or other procedure may be disposed of by and at the discretion of the [facility] staff and [facility]. 5.  I understand that in the event of a medical emergency, I will be transported by local paramedics to [facility] or other hospital emergency department. 6.  I certify that I have read and fully understand the above consent to operation and/or other procedure. 7.  I acknowledge that my physician has explained sedation/analgesia administration to me including the risks and benefits. I consent to the administration of sedation/analgesia as may be necessary or desirable in the judgement of my physician. Witness signature: ___________________________________________________ Date:  ______/______/_____                    Time:  ________ A. M.  P.M.        Patient Name:  Lizet Clay  4/28/1955  [id number]       Patient signature:  ___________________________________________________             Relationship to Patient:           []  Parent    Responsible person                          []  Spouse  In case of minor or                    [] Other  _____________   Incompetent name:  __________________________________________________                               (please print)      _____________      Responsible person  In case of minor or  Incompetent signature:  _______________________________________________    Statement of Physician  My signature below affirms that prior to the time of the procedure, I have explained to the patient and/or his/her guardian, the risks and benefits involved in the proposed treatment and any reasonable alternative to the proposed treatment. I have also explained the risks and benefits involved in the refusal of the proposed treatment and have answered the patient's questions.                         Date:  ______/______/_______  Provider                      Signature:  __________________________________________________________       Time:  ___________ A.M    P.M.